# Patient Record
Sex: MALE | Race: WHITE | Employment: FULL TIME | URBAN - NONMETROPOLITAN AREA
[De-identification: names, ages, dates, MRNs, and addresses within clinical notes are randomized per-mention and may not be internally consistent; named-entity substitution may affect disease eponyms.]

---

## 2017-01-04 ENCOUNTER — TELEPHONE (OUTPATIENT)
Dept: PAIN MANAGEMENT | Age: 42
End: 2017-01-04

## 2017-01-04 ENCOUNTER — TELEPHONE (OUTPATIENT)
Dept: INTERNAL MEDICINE | Age: 42
End: 2017-01-04

## 2017-01-09 RX ORDER — LISINOPRIL AND HYDROCHLOROTHIAZIDE 12.5; 1 MG/1; MG/1
TABLET ORAL
Qty: 90 TABLET | Refills: 3 | Status: SHIPPED | OUTPATIENT
Start: 2017-01-09 | End: 2018-01-10 | Stop reason: SDUPTHER

## 2017-01-11 ENCOUNTER — TELEPHONE (OUTPATIENT)
Dept: INTERNAL MEDICINE | Age: 42
End: 2017-01-11

## 2017-01-11 ENCOUNTER — TELEPHONE (OUTPATIENT)
Dept: PAIN MANAGEMENT | Age: 42
End: 2017-01-11

## 2017-01-11 DIAGNOSIS — M96.1 POST LAMINECTOMY SYNDROME: Primary | ICD-10-CM

## 2017-02-03 ENCOUNTER — OFFICE VISIT (OUTPATIENT)
Dept: INTERNAL MEDICINE | Age: 42
End: 2017-02-03

## 2017-02-03 VITALS
HEIGHT: 72 IN | DIASTOLIC BLOOD PRESSURE: 70 MMHG | BODY MASS INDEX: 40.88 KG/M2 | WEIGHT: 301.8 LBS | SYSTOLIC BLOOD PRESSURE: 122 MMHG | HEART RATE: 80 BPM

## 2017-02-03 DIAGNOSIS — R20.0 HAND NUMBNESS: Primary | ICD-10-CM

## 2017-02-03 DIAGNOSIS — G56.21 ULNAR NEUROPATHY AT ELBOW OF RIGHT UPPER EXTREMITY: Primary | ICD-10-CM

## 2017-02-03 DIAGNOSIS — E03.9 HYPOTHYROIDISM, UNSPECIFIED TYPE: ICD-10-CM

## 2017-02-03 DIAGNOSIS — K21.9 GASTROESOPHAGEAL REFLUX DISEASE WITHOUT ESOPHAGITIS: ICD-10-CM

## 2017-02-03 DIAGNOSIS — E78.2 MIXED HYPERLIPIDEMIA: ICD-10-CM

## 2017-02-03 DIAGNOSIS — R93.0 ABNORMAL MRI OF THE HEAD: ICD-10-CM

## 2017-02-03 DIAGNOSIS — E66.01 OBESITY, MORBID, BMI 40.0-49.9 (HCC): ICD-10-CM

## 2017-02-03 DIAGNOSIS — Z72.0 TOBACCO ABUSE: ICD-10-CM

## 2017-02-03 DIAGNOSIS — I10 ESSENTIAL HYPERTENSION: ICD-10-CM

## 2017-02-03 DIAGNOSIS — R73.01 IFG (IMPAIRED FASTING GLUCOSE): ICD-10-CM

## 2017-02-03 DIAGNOSIS — M96.1 POST LAMINECTOMY SYNDROME: ICD-10-CM

## 2017-02-03 DIAGNOSIS — E29.1 HYPOGONADISM IN MALE: ICD-10-CM

## 2017-02-03 PROCEDURE — 99214 OFFICE O/P EST MOD 30 MIN: CPT | Performed by: INTERNAL MEDICINE

## 2017-02-03 RX ORDER — HYDROCODONE BITARTRATE AND ACETAMINOPHEN 10; 325 MG/1; MG/1
TABLET ORAL
Refills: 0 | COMMUNITY
Start: 2017-02-02 | End: 2018-02-27

## 2017-02-03 ASSESSMENT — ENCOUNTER SYMPTOMS
SORE THROAT: 0
BLOOD IN STOOL: 0
SHORTNESS OF BREATH: 0
EYE DISCHARGE: 0
DOUBLE VISION: 0
NAUSEA: 0
COUGH: 0
VOMITING: 0
ABDOMINAL PAIN: 0
CONSTIPATION: 0
BLURRED VISION: 0
EYE PAIN: 0
WHEEZING: 0
DIARRHEA: 0

## 2017-02-22 ENCOUNTER — TELEPHONE (OUTPATIENT)
Dept: INTERNAL MEDICINE | Age: 42
End: 2017-02-22

## 2017-02-22 ENCOUNTER — OFFICE VISIT (OUTPATIENT)
Dept: NEUROLOGY | Age: 42
End: 2017-02-22

## 2017-02-22 DIAGNOSIS — R20.2 PARESTHESIA: ICD-10-CM

## 2017-02-22 DIAGNOSIS — G56.01 CARPAL TUNNEL SYNDROME OF RIGHT WRIST: Primary | ICD-10-CM

## 2017-02-22 DIAGNOSIS — R53.1 WEAKNESS: ICD-10-CM

## 2017-02-22 DIAGNOSIS — G56.21 ULNAR NEUROPATHY AT ELBOW OF RIGHT UPPER EXTREMITY: ICD-10-CM

## 2017-02-22 DIAGNOSIS — M62.838 MUSCLE SPASM: ICD-10-CM

## 2017-02-22 DIAGNOSIS — G56.11 MEDIAN NERVE LESION, RIGHT: ICD-10-CM

## 2017-02-22 DIAGNOSIS — M54.12 CERVICAL RADICULOPATHY: ICD-10-CM

## 2017-02-22 DIAGNOSIS — M79.601 PAIN IN RIGHT ARM: ICD-10-CM

## 2017-02-22 PROCEDURE — 95910 NRV CNDJ TEST 7-8 STUDIES: CPT | Performed by: PSYCHIATRY & NEUROLOGY

## 2017-02-22 PROCEDURE — 95886 MUSC TEST DONE W/N TEST COMP: CPT | Performed by: PSYCHIATRY & NEUROLOGY

## 2017-02-24 ENCOUNTER — TELEPHONE (OUTPATIENT)
Dept: INTERNAL MEDICINE | Age: 42
End: 2017-02-24

## 2017-02-24 DIAGNOSIS — R20.0 NUMBNESS IN BOTH HANDS: Primary | ICD-10-CM

## 2017-05-23 ENCOUNTER — TELEPHONE (OUTPATIENT)
Dept: UROLOGY | Age: 42
End: 2017-05-23

## 2017-05-30 DIAGNOSIS — E29.1 HYPOGONADISM IN MALE: Primary | ICD-10-CM

## 2017-05-30 DIAGNOSIS — E29.1 HYPOGONADISM IN MALE: ICD-10-CM

## 2017-05-30 LAB — PROSTATE SPECIFIC ANTIGEN: 0.72 UG/L

## 2017-05-30 PROCEDURE — 84153 ASSAY OF PSA TOTAL: CPT | Performed by: UROLOGY

## 2017-06-06 ENCOUNTER — OFFICE VISIT (OUTPATIENT)
Dept: UROLOGY | Age: 42
End: 2017-06-06
Payer: COMMERCIAL

## 2017-06-06 VITALS
HEIGHT: 72 IN | DIASTOLIC BLOOD PRESSURE: 88 MMHG | SYSTOLIC BLOOD PRESSURE: 136 MMHG | WEIGHT: 280 LBS | HEART RATE: 88 BPM | BODY MASS INDEX: 37.93 KG/M2

## 2017-06-06 DIAGNOSIS — E29.1 HYPOGONADISM IN MALE: Primary | ICD-10-CM

## 2017-06-06 PROCEDURE — 99213 OFFICE O/P EST LOW 20 MIN: CPT | Performed by: UROLOGY

## 2017-06-06 RX ORDER — TADALAFIL 20 MG/1
20 TABLET ORAL PRN
Qty: 36 TABLET | Refills: 3 | Status: SHIPPED | OUTPATIENT
Start: 2017-06-06 | End: 2018-08-20 | Stop reason: SDUPTHER

## 2017-06-06 RX ORDER — CLONAZEPAM 1 MG/1
1 TABLET ORAL DAILY
COMMUNITY
End: 2019-03-13

## 2017-06-07 RX ORDER — TESTOSTERONE CYPIONATE 200 MG/ML
200 INJECTION, SOLUTION INTRAMUSCULAR
Qty: 5 VIAL | Refills: 5 | Status: SHIPPED | OUTPATIENT
Start: 2017-06-07 | End: 2018-03-13

## 2017-08-15 ENCOUNTER — OFFICE VISIT (OUTPATIENT)
Dept: INTERNAL MEDICINE | Age: 42
End: 2017-08-15
Payer: COMMERCIAL

## 2017-08-15 VITALS
HEIGHT: 72 IN | TEMPERATURE: 97.6 F | HEART RATE: 80 BPM | RESPIRATION RATE: 18 BRPM | DIASTOLIC BLOOD PRESSURE: 80 MMHG | BODY MASS INDEX: 40.12 KG/M2 | SYSTOLIC BLOOD PRESSURE: 132 MMHG | WEIGHT: 296.2 LBS

## 2017-08-15 DIAGNOSIS — R10.11 RUQ PAIN: Primary | ICD-10-CM

## 2017-08-15 PROCEDURE — 99213 OFFICE O/P EST LOW 20 MIN: CPT | Performed by: NURSE PRACTITIONER

## 2017-08-15 ASSESSMENT — ENCOUNTER SYMPTOMS
VOMITING: 0
COUGH: 0
WHEEZING: 0
ANAL BLEEDING: 0
CHOKING: 0
BACK PAIN: 1
APNEA: 0
RECTAL PAIN: 0
ABDOMINAL DISTENTION: 0
ABDOMINAL PAIN: 1
DIARRHEA: 0
NAUSEA: 0
BLOOD IN STOOL: 0
CONSTIPATION: 0
COLOR CHANGE: 0
SHORTNESS OF BREATH: 0

## 2017-08-22 ENCOUNTER — HOSPITAL ENCOUNTER (OUTPATIENT)
Dept: ULTRASOUND IMAGING | Age: 42
Discharge: HOME | End: 2017-08-22
Payer: COMMERCIAL

## 2017-08-22 ENCOUNTER — HOSPITAL ENCOUNTER (OUTPATIENT)
Dept: LAB | Age: 42
Discharge: HOME | End: 2017-08-22
Payer: COMMERCIAL

## 2017-08-22 DIAGNOSIS — E29.1 HYPOGONADISM IN MALE: ICD-10-CM

## 2017-08-22 DIAGNOSIS — R10.11 RUQ PAIN: ICD-10-CM

## 2017-08-22 LAB
ALBUMIN SERPL-MCNC: 4.4 G/DL (ref 3.5–5.2)
ALBUMIN/GLOBULIN RATIO: 1.5 (ref 1–2.5)
ALP BLD-CCNC: 80 U/L (ref 40–129)
ALT SERPL-CCNC: 30 U/L (ref 5–41)
ANION GAP SERPL CALCULATED.3IONS-SCNC: 13 MMOL/L (ref 9–17)
AST SERPL-CCNC: 22 U/L
BILIRUB SERPL-MCNC: 0.25 MG/DL (ref 0.3–1.2)
BUN BLDV-MCNC: 11 MG/DL (ref 6–20)
BUN/CREAT BLD: 19 (ref 9–20)
CALCIUM SERPL-MCNC: 9.1 MG/DL (ref 8.6–10.4)
CHLORIDE BLD-SCNC: 103 MMOL/L (ref 98–107)
CO2: 26 MMOL/L (ref 20–31)
CREAT SERPL-MCNC: 0.58 MG/DL (ref 0.7–1.2)
GFR AFRICAN AMERICAN: >60 ML/MIN
GFR NON-AFRICAN AMERICAN: >60 ML/MIN
GFR SERPL CREATININE-BSD FRML MDRD: ABNORMAL ML/MIN/{1.73_M2}
GFR SERPL CREATININE-BSD FRML MDRD: ABNORMAL ML/MIN/{1.73_M2}
GLUCOSE BLD-MCNC: 118 MG/DL (ref 70–99)
POTASSIUM SERPL-SCNC: 4.5 MMOL/L (ref 3.7–5.3)
SEX HORMONE BINDING GLOBULIN: 18 NMOL/L (ref 11–80)
SODIUM BLD-SCNC: 142 MMOL/L (ref 135–144)
TESTOSTERONE FREE-NONMALE: 57.8 PG/ML (ref 47–244)
TESTOSTERONE TOTAL: 222 NG/DL (ref 220–1000)
TOTAL PROTEIN: 7.4 G/DL (ref 6.4–8.3)

## 2017-08-22 PROCEDURE — 84270 ASSAY OF SEX HORMONE GLOBUL: CPT

## 2017-08-22 PROCEDURE — 84403 ASSAY OF TOTAL TESTOSTERONE: CPT

## 2017-08-22 PROCEDURE — 36415 COLL VENOUS BLD VENIPUNCTURE: CPT

## 2017-08-22 PROCEDURE — 76705 ECHO EXAM OF ABDOMEN: CPT

## 2017-08-22 PROCEDURE — 80053 COMPREHEN METABOLIC PANEL: CPT

## 2017-09-05 ENCOUNTER — TELEPHONE (OUTPATIENT)
Dept: UROLOGY | Age: 42
End: 2017-09-05

## 2017-09-06 ENCOUNTER — TELEPHONE (OUTPATIENT)
Dept: UROLOGY | Age: 42
End: 2017-09-06

## 2017-09-19 ENCOUNTER — OFFICE VISIT (OUTPATIENT)
Dept: UROLOGY | Age: 42
End: 2017-09-19
Payer: COMMERCIAL

## 2017-09-19 VITALS
WEIGHT: 285 LBS | DIASTOLIC BLOOD PRESSURE: 80 MMHG | SYSTOLIC BLOOD PRESSURE: 136 MMHG | HEIGHT: 72 IN | BODY MASS INDEX: 38.6 KG/M2 | HEART RATE: 88 BPM

## 2017-09-19 DIAGNOSIS — E29.1 HYPOGONADISM IN MALE: Primary | ICD-10-CM

## 2017-09-19 PROCEDURE — 99213 OFFICE O/P EST LOW 20 MIN: CPT | Performed by: UROLOGY

## 2017-09-29 RX ORDER — LORCASERIN HYDROCHLORIDE HEMIHYDRATE 10 MG/1
TABLET ORAL
Qty: 60 TABLET | Refills: 3 | OUTPATIENT
Start: 2017-09-29

## 2018-01-10 RX ORDER — LISINOPRIL AND HYDROCHLOROTHIAZIDE 12.5; 1 MG/1; MG/1
TABLET ORAL
Qty: 90 TABLET | Refills: 3 | Status: SHIPPED | OUTPATIENT
Start: 2018-01-10 | End: 2019-11-26

## 2018-02-27 ENCOUNTER — OFFICE VISIT (OUTPATIENT)
Dept: INTERNAL MEDICINE | Age: 43
End: 2018-02-27
Payer: COMMERCIAL

## 2018-02-27 VITALS
HEART RATE: 96 BPM | SYSTOLIC BLOOD PRESSURE: 136 MMHG | HEIGHT: 72 IN | DIASTOLIC BLOOD PRESSURE: 84 MMHG | WEIGHT: 285 LBS | BODY MASS INDEX: 38.6 KG/M2

## 2018-02-27 DIAGNOSIS — N52.9 ERECTILE DYSFUNCTION, UNSPECIFIED ERECTILE DYSFUNCTION TYPE: ICD-10-CM

## 2018-02-27 DIAGNOSIS — E78.2 MIXED HYPERLIPIDEMIA: ICD-10-CM

## 2018-02-27 DIAGNOSIS — R73.01 IFG (IMPAIRED FASTING GLUCOSE): ICD-10-CM

## 2018-02-27 DIAGNOSIS — Z11.4 SCREENING FOR HIV WITHOUT PRESENCE OF RISK FACTORS: ICD-10-CM

## 2018-02-27 DIAGNOSIS — M25.512 ACUTE PAIN OF LEFT SHOULDER: ICD-10-CM

## 2018-02-27 DIAGNOSIS — R93.0 ABNORMAL MRI OF THE HEAD: ICD-10-CM

## 2018-02-27 DIAGNOSIS — E03.9 HYPOTHYROIDISM, UNSPECIFIED TYPE: ICD-10-CM

## 2018-02-27 DIAGNOSIS — M54.50 ACUTE MIDLINE LOW BACK PAIN WITHOUT SCIATICA: ICD-10-CM

## 2018-02-27 DIAGNOSIS — E29.1 HYPOGONADISM IN MALE: ICD-10-CM

## 2018-02-27 DIAGNOSIS — Z72.0 TOBACCO ABUSE: ICD-10-CM

## 2018-02-27 DIAGNOSIS — E66.01 OBESITY, MORBID, BMI 40.0-49.9 (HCC): ICD-10-CM

## 2018-02-27 DIAGNOSIS — K76.0 FATTY LIVER: ICD-10-CM

## 2018-02-27 DIAGNOSIS — M96.1 POST LAMINECTOMY SYNDROME: ICD-10-CM

## 2018-02-27 DIAGNOSIS — I10 ESSENTIAL HYPERTENSION: ICD-10-CM
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
## 2018-02-27 DIAGNOSIS — F41.9 ANXIETY: ICD-10-CM

## 2018-02-27 PROCEDURE — 99214 OFFICE O/P EST MOD 30 MIN: CPT | Performed by: INTERNAL MEDICINE

## 2018-02-27 RX ORDER — OXYCODONE AND ACETAMINOPHEN 7.5; 325 MG/1; MG/1
1 TABLET ORAL 4 TIMES DAILY PRN
Refills: 0 | COMMUNITY
Start: 2018-02-02 | End: 2020-11-04

## 2018-02-27 ASSESSMENT — PATIENT HEALTH QUESTIONNAIRE - PHQ9
SUM OF ALL RESPONSES TO PHQ9 QUESTIONS 1 & 2: 0
2. FEELING DOWN, DEPRESSED OR HOPELESS: 0
1. LITTLE INTEREST OR PLEASURE IN DOING THINGS: 0
SUM OF ALL RESPONSES TO PHQ QUESTIONS 1-9: 0

## 2018-02-28 ENCOUNTER — HOSPITAL ENCOUNTER (OUTPATIENT)
Dept: GENERAL RADIOLOGY | Age: 43
Discharge: HOME | End: 2018-03-02
Payer: COMMERCIAL

## 2018-02-28 ENCOUNTER — HOSPITAL ENCOUNTER (OUTPATIENT)
Dept: LAB | Age: 43
Setting detail: SPECIMEN
Discharge: HOME | End: 2018-02-28
Payer: COMMERCIAL

## 2018-02-28 DIAGNOSIS — E29.1 HYPOGONADISM IN MALE: ICD-10-CM

## 2018-02-28 DIAGNOSIS — R73.01 IFG (IMPAIRED FASTING GLUCOSE): ICD-10-CM

## 2018-02-28 DIAGNOSIS — I10 ESSENTIAL HYPERTENSION: ICD-10-CM

## 2018-02-28 DIAGNOSIS — M25.512 ACUTE PAIN OF LEFT SHOULDER: ICD-10-CM

## 2018-02-28 DIAGNOSIS — Z11.4 SCREENING FOR HIV WITHOUT PRESENCE OF RISK FACTORS: ICD-10-CM

## 2018-02-28 DIAGNOSIS — M54.50 ACUTE MIDLINE LOW BACK PAIN WITHOUT SCIATICA: ICD-10-CM

## 2018-02-28 DIAGNOSIS — E03.9 HYPOTHYROIDISM, UNSPECIFIED TYPE: ICD-10-CM

## 2018-02-28 DIAGNOSIS — E78.2 MIXED HYPERLIPIDEMIA: ICD-10-CM

## 2018-02-28 LAB
ABSOLUTE EOS #: 0.1 K/UL (ref 0–0.4)
ABSOLUTE IMMATURE GRANULOCYTE: ABNORMAL K/UL (ref 0–0.3)
ABSOLUTE LYMPH #: 2.8 K/UL (ref 1–4.8)
ABSOLUTE MONO #: 0.6 K/UL (ref 0.1–1.2)
ALBUMIN SERPL-MCNC: 4.4 G/DL (ref 3.5–5.2)
ALBUMIN/GLOBULIN RATIO: 1.4 (ref 1–2.5)
ALP BLD-CCNC: 80 U/L (ref 40–129)
ALT SERPL-CCNC: 13 U/L (ref 5–41)
ANION GAP SERPL CALCULATED.3IONS-SCNC: 13 MMOL/L (ref 9–17)
AST SERPL-CCNC: 12 U/L
BASOPHILS # BLD: 0 % (ref 0–2)
BASOPHILS ABSOLUTE: 0 K/UL (ref 0–0.2)
BILIRUB SERPL-MCNC: 0.35 MG/DL (ref 0.3–1.2)
BUN BLDV-MCNC: 14 MG/DL (ref 6–20)
BUN/CREAT BLD: 24 (ref 9–20)
CALCIUM SERPL-MCNC: 9.4 MG/DL (ref 8.6–10.4)
CHLORIDE BLD-SCNC: 102 MMOL/L (ref 98–107)
CHOLESTEROL/HDL RATIO: 5
CHOLESTEROL: 211 MG/DL
CO2: 28 MMOL/L (ref 20–31)
CREAT SERPL-MCNC: 0.58 MG/DL (ref 0.7–1.2)
DIFFERENTIAL TYPE: ABNORMAL
EOSINOPHILS RELATIVE PERCENT: 1 % (ref 1–8)
ESTIMATED AVERAGE GLUCOSE: 128 MG/DL
GFR AFRICAN AMERICAN: >60 ML/MIN
GFR NON-AFRICAN AMERICAN: >60 ML/MIN
GFR SERPL CREATININE-BSD FRML MDRD: ABNORMAL ML/MIN/{1.73_M2}
GFR SERPL CREATININE-BSD FRML MDRD: ABNORMAL ML/MIN/{1.73_M2}
GLUCOSE BLD-MCNC: 108 MG/DL (ref 70–99)
HBA1C MFR BLD: 6.1 % (ref 4.8–5.9)
HCT VFR BLD CALC: 41.3 % (ref 41–53)
HDLC SERPL-MCNC: 42 MG/DL
HEMOGLOBIN: 13.9 G/DL (ref 13.5–17.5)
HIV AG/AB: NONREACTIVE
IMMATURE GRANULOCYTES: ABNORMAL %
LDL CHOLESTEROL: 145 MG/DL (ref 0–130)
LYMPHOCYTES # BLD: 37 % (ref 15–43)
MCH RBC QN AUTO: 31.6 PG (ref 26–34)
MCHC RBC AUTO-ENTMCNC: 33.5 G/DL (ref 31–37)
MCV RBC AUTO: 94.4 FL (ref 80–100)
MONOCYTES # BLD: 8 % (ref 6–14)
NRBC AUTOMATED: ABNORMAL PER 100 WBC
PDW BLD-RTO: 13.5 % (ref 11–14.5)
PLATELET # BLD: 240 K/UL (ref 140–450)
PLATELET ESTIMATE: ABNORMAL
PMV BLD AUTO: 9.5 FL (ref 6–12)
POTASSIUM SERPL-SCNC: 4.4 MMOL/L (ref 3.7–5.3)
PROSTATE SPECIFIC ANTIGEN: 0.52 UG/L
RBC # BLD: 4.38 M/UL (ref 4.5–5.9)
RBC # BLD: ABNORMAL 10*6/UL
SEG NEUTROPHILS: 54 % (ref 44–74)
SEGMENTED NEUTROPHILS ABSOLUTE COUNT: 4 K/UL (ref 1.8–7.7)
SODIUM BLD-SCNC: 143 MMOL/L (ref 135–144)
TESTOSTERONE TOTAL: 262 NG/DL (ref 220–1000)
TOTAL PROTEIN: 7.6 G/DL (ref 6.4–8.3)
TRIGL SERPL-MCNC: 119 MG/DL
TSH SERPL DL<=0.05 MIU/L-ACNC: 1.03 MIU/L (ref 0.3–5)
VLDLC SERPL CALC-MCNC: ABNORMAL MG/DL (ref 1–30)
WBC # BLD: 7.5 K/UL (ref 3.5–11)
WBC # BLD: ABNORMAL 10*3/UL

## 2018-02-28 PROCEDURE — 73030 X-RAY EXAM OF SHOULDER: CPT

## 2018-02-28 PROCEDURE — 87389 HIV-1 AG W/HIV-1&-2 AB AG IA: CPT

## 2018-02-28 PROCEDURE — 80061 LIPID PANEL: CPT

## 2018-02-28 PROCEDURE — 84443 ASSAY THYROID STIM HORMONE: CPT

## 2018-02-28 PROCEDURE — 36415 COLL VENOUS BLD VENIPUNCTURE: CPT

## 2018-02-28 PROCEDURE — 72100 X-RAY EXAM L-S SPINE 2/3 VWS: CPT

## 2018-02-28 PROCEDURE — 84153 ASSAY OF PSA TOTAL: CPT

## 2018-02-28 PROCEDURE — 83036 HEMOGLOBIN GLYCOSYLATED A1C: CPT

## 2018-02-28 PROCEDURE — 80053 COMPREHEN METABOLIC PANEL: CPT

## 2018-02-28 PROCEDURE — 84403 ASSAY OF TOTAL TESTOSTERONE: CPT

## 2018-02-28 PROCEDURE — 85025 COMPLETE CBC W/AUTO DIFF WBC: CPT
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
## 2018-03-01 DIAGNOSIS — R93.0 ABNORMAL MRI OF HEAD: Primary | ICD-10-CM

## 2018-03-01 DIAGNOSIS — M25.512 LEFT SHOULDER PAIN, UNSPECIFIED CHRONICITY: ICD-10-CM

## 2018-03-01 DIAGNOSIS — M54.50 LOW BACK PAIN WITHOUT SCIATICA, UNSPECIFIED BACK PAIN LATERALITY, UNSPECIFIED CHRONICITY: Primary | ICD-10-CM
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
## 2018-03-13 ENCOUNTER — OFFICE VISIT (OUTPATIENT)
Dept: INTERNAL MEDICINE | Age: 43
End: 2018-03-13
Payer: COMMERCIAL

## 2018-03-13 VITALS
WEIGHT: 288 LBS | HEIGHT: 72 IN | BODY MASS INDEX: 39.01 KG/M2 | SYSTOLIC BLOOD PRESSURE: 136 MMHG | DIASTOLIC BLOOD PRESSURE: 86 MMHG | HEART RATE: 96 BPM

## 2018-03-13 DIAGNOSIS — E29.1 HYPOGONADISM IN MALE: ICD-10-CM

## 2018-03-13 DIAGNOSIS — E78.2 MIXED HYPERLIPIDEMIA: ICD-10-CM

## 2018-03-13 DIAGNOSIS — R41.3 MEMORY CHANGE: ICD-10-CM

## 2018-03-13 DIAGNOSIS — I10 ESSENTIAL HYPERTENSION: ICD-10-CM

## 2018-03-13 DIAGNOSIS — E66.01 OBESITY, MORBID, BMI 40.0-49.9 (HCC): ICD-10-CM

## 2018-03-13 DIAGNOSIS — E03.9 HYPOTHYROIDISM, UNSPECIFIED TYPE: ICD-10-CM

## 2018-03-13 DIAGNOSIS — R73.01 IFG (IMPAIRED FASTING GLUCOSE): ICD-10-CM
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
## 2018-03-13 DIAGNOSIS — M54.50 ACUTE MIDLINE LOW BACK PAIN WITHOUT SCIATICA: ICD-10-CM

## 2018-03-13 DIAGNOSIS — K76.0 FATTY LIVER: ICD-10-CM

## 2018-03-13 DIAGNOSIS — F41.9 ANXIETY: ICD-10-CM

## 2018-03-13 DIAGNOSIS — Z72.0 TOBACCO ABUSE: ICD-10-CM

## 2018-03-13 DIAGNOSIS — M96.1 POST LAMINECTOMY SYNDROME: ICD-10-CM

## 2018-03-13 DIAGNOSIS — M25.512 ACUTE PAIN OF LEFT SHOULDER: ICD-10-CM

## 2018-03-13 DIAGNOSIS — R93.0 ABNORMAL MRI OF THE HEAD: ICD-10-CM
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
## 2018-03-13 PROCEDURE — 99214 OFFICE O/P EST MOD 30 MIN: CPT | Performed by: INTERNAL MEDICINE

## 2018-03-13 NOTE — PROGRESS NOTES
The 10-year ASCVD risk score (Mariam Putnam et al., 2013) is: 6.8%    Values used to calculate the score:      Age: 38 years      Sex: Male      Is Non- : No      Diabetic: No      Tobacco smoker: Yes      Systolic Blood Pressure: 276 mmHg      Is BP treated: Yes      HDL Cholesterol: 42 mg/dL      Total Cholesterol: 211 mg/dL
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
## 2018-03-27 ENCOUNTER — HOSPITAL ENCOUNTER (OUTPATIENT)
Dept: LAB | Age: 43
Setting detail: SPECIMEN
Discharge: HOME | End: 2018-03-27
Payer: COMMERCIAL

## 2018-03-27 DIAGNOSIS — R41.3 MEMORY CHANGE: ICD-10-CM

## 2018-03-27 LAB — VITAMIN B-12: 324 PG/ML (ref 232–1245)

## 2018-03-27 PROCEDURE — 82607 VITAMIN B-12: CPT

## 2018-03-27 PROCEDURE — 36415 COLL VENOUS BLD VENIPUNCTURE: CPT

## 2018-04-03 ENCOUNTER — TELEPHONE (OUTPATIENT)
Dept: INTERNAL MEDICINE | Age: 43
End: 2018-04-03

## 2018-04-03 DIAGNOSIS — F41.9 ANXIETY: Primary | ICD-10-CM

## 2018-05-31 ENCOUNTER — OFFICE VISIT (OUTPATIENT)
Dept: INTERNAL MEDICINE | Age: 43
End: 2018-05-31
Payer: COMMERCIAL

## 2018-05-31 VITALS
BODY MASS INDEX: 38.74 KG/M2 | HEIGHT: 72 IN | SYSTOLIC BLOOD PRESSURE: 114 MMHG | DIASTOLIC BLOOD PRESSURE: 86 MMHG | HEART RATE: 92 BPM | WEIGHT: 286 LBS

## 2018-05-31 DIAGNOSIS — F41.9 ANXIETY: Primary | ICD-10-CM

## 2018-05-31 DIAGNOSIS — K76.0 FATTY LIVER: ICD-10-CM

## 2018-05-31 DIAGNOSIS — E66.01 OBESITY, MORBID, BMI 40.0-49.9 (HCC): ICD-10-CM

## 2018-05-31 DIAGNOSIS — I10 ESSENTIAL HYPERTENSION: ICD-10-CM

## 2018-05-31 DIAGNOSIS — R93.0 ABNORMAL MRI OF THE HEAD: ICD-10-CM

## 2018-05-31 DIAGNOSIS — Z72.0 TOBACCO ABUSE: ICD-10-CM

## 2018-05-31 DIAGNOSIS — E78.2 MIXED HYPERLIPIDEMIA: ICD-10-CM

## 2018-05-31 DIAGNOSIS — M96.1 POST LAMINECTOMY SYNDROME: ICD-10-CM

## 2018-05-31 DIAGNOSIS — R73.01 IFG (IMPAIRED FASTING GLUCOSE): ICD-10-CM

## 2018-05-31 DIAGNOSIS — E03.9 HYPOTHYROIDISM, UNSPECIFIED TYPE: ICD-10-CM

## 2018-05-31 PROCEDURE — 99213 OFFICE O/P EST LOW 20 MIN: CPT | Performed by: INTERNAL MEDICINE

## 2018-06-07 ENCOUNTER — HOSPITAL ENCOUNTER (OUTPATIENT)
Dept: LAB | Age: 43
Setting detail: SPECIMEN
Discharge: HOME | End: 2018-06-07
Payer: COMMERCIAL

## 2018-06-07 PROCEDURE — 36415 COLL VENOUS BLD VENIPUNCTURE: CPT

## 2018-06-07 PROCEDURE — 82652 VIT D 1 25-DIHYDROXY: CPT

## 2018-06-10 LAB — VITAMIN D 1,25-DIHYDROXY: 40.1 PG/ML (ref 19.9–79.3)

## 2018-08-20 DIAGNOSIS — E29.1 HYPOGONADISM IN MALE: ICD-10-CM

## 2018-08-20 RX ORDER — TADALAFIL 20 MG/1
20 TABLET ORAL PRN
Qty: 36 TABLET | Refills: 3 | Status: SHIPPED | OUTPATIENT
Start: 2018-08-20 | End: 2019-01-07 | Stop reason: SDUPTHER

## 2018-09-24 ENCOUNTER — OFFICE VISIT (OUTPATIENT)
Dept: INTERNAL MEDICINE | Age: 43
End: 2018-09-24
Payer: COMMERCIAL

## 2018-09-24 VITALS
DIASTOLIC BLOOD PRESSURE: 82 MMHG | HEIGHT: 72 IN | BODY MASS INDEX: 39.28 KG/M2 | WEIGHT: 290 LBS | SYSTOLIC BLOOD PRESSURE: 112 MMHG | HEART RATE: 84 BPM

## 2018-09-24 DIAGNOSIS — E66.01 OBESITY, MORBID, BMI 40.0-49.9 (HCC): ICD-10-CM

## 2018-09-24 DIAGNOSIS — Z87.891 PERSONAL HISTORY OF TOBACCO USE, PRESENTING HAZARDS TO HEALTH: ICD-10-CM

## 2018-09-24 DIAGNOSIS — R93.0 ABNORMAL MRI OF THE HEAD: ICD-10-CM

## 2018-09-24 DIAGNOSIS — R73.01 IFG (IMPAIRED FASTING GLUCOSE): Primary | ICD-10-CM

## 2018-09-24 DIAGNOSIS — M96.1 POST LAMINECTOMY SYNDROME: ICD-10-CM

## 2018-09-24 DIAGNOSIS — I10 ESSENTIAL HYPERTENSION: ICD-10-CM

## 2018-09-24 DIAGNOSIS — K76.0 FATTY LIVER: ICD-10-CM

## 2018-09-24 DIAGNOSIS — F41.9 ANXIETY: ICD-10-CM

## 2018-09-24 DIAGNOSIS — E03.9 HYPOTHYROIDISM, UNSPECIFIED TYPE: ICD-10-CM

## 2018-09-24 DIAGNOSIS — E78.2 MIXED HYPERLIPIDEMIA: ICD-10-CM

## 2018-09-24 PROCEDURE — 99214 OFFICE O/P EST MOD 30 MIN: CPT | Performed by: INTERNAL MEDICINE

## 2018-09-24 PROCEDURE — 99406 BEHAV CHNG SMOKING 3-10 MIN: CPT | Performed by: INTERNAL MEDICINE

## 2018-10-17 NOTE — PROGRESS NOTES
Marsha Miguel received counseling on the following healthy behaviors: tobacco cessation  Reviewed prior labs and health maintenance  Continue current medications except where noted below, diet and exercise. Discussed use, benefit, and side effects of prescribed medications. Barriers to medication compliance addressed. Patient given educational materials - see patient instructions  Was a self-tracking handout given in paper form or via [employer]? Yes    Requested Prescriptions      No prescriptions requested or ordered in this encounter       All patient questions answered. Patient voiced understanding. Quality Measures    Body mass index is 39.33 kg/m². Elevated. Weight control planned discussed: Healthy diet and regular exercise    BP: (!) 120/94 Blood pressure is high. Treatment plan consists of: see progress note below. Lab Results   Component Value Date    LDLCHOLESTEROL 145 (H) 02/28/2018    (goal LDL reduction with dx if diabetes is 50% LDL reduction)      PHQ Scores 2/27/2018 11/14/2016   PHQ2 Score 0 0   PHQ9 Score 0 0     Interpretation of Total Score Depression Severity: 1-4 = Minimal depression, 5-9 = Mild depression, 10-14 = Moderate depression, 15-19 = Moderately severe depression, 20-27 = Severe depression      Tobacco Cessation Counseling: Patient advised about behavior change, including information about personal health harms, usage of appropriate cessation measures and benefits of cessation.   Time spent (minutes): 3
Overdue lab reminder letter mailed to patient.
He has a normal mood and affect. Her behavior is normal.  Judgment normal.       Lab Results   Component Value Date    K 4.4 02/28/2018    CREATININE 0.58 02/28/2018    AST 12 02/28/2018    ALT 13 02/28/2018    TSH 1.03 02/28/2018    HCT 41.3 02/28/2018    LABA1C 6.1 02/28/2018    PSA 0.52 02/28/2018    GLUCOSE 108 02/28/2018    CALCIUM 9.4 02/28/2018    IVSZXTUR44 324 03/27/2018      Lab Results   Component Value Date    CHOL 211 02/28/2018    TRIG 119 02/28/2018    HDL 42 02/28/2018    LDLCHOLESTEROL 145 02/28/2018       Labs reviewed and discussed with the patient? Yes    Assessment/Plan:    1. IFG (impaired fasting glucose)  Fasting glucose pending today. Work on diet. - Comprehensive Metabolic Panel; Future    2. Fatty liver  Most recent LFTs looked normal.  Repeat LFTs pending. Work on diet, exercise and weight loss. 3. Abnormal MRI of the head  Saw Dr. Kwame Cole, neurologist in [facility], who recommended a repeat MRI at a period of 3-5 years. He does not have symptoms referable to this. 4. Anxiety  We are going to request the records from Dr. Niya Mota office for review. Appears to be stable. Addenda 10/7/18  RX history of prozac 20 qd, and divalproex 250 mg BID per Dr Gopal Lau August 2018  5. Post laminectomy syndrome  Following with Dr. Crow Avery up at [address] Appears stable. 6. Hypothyroidism, unspecified type  Not on medication at this point. Most recent TSH looked good. Continue to monitor. 7. Essential hypertension  Well controlled on current regimen. No change. 8. Mixed hyperlipidemia  10-year risk is about 6.8%. Working on diet. Continue to monitor. 9. Obesity, morbid, BMI 40.0-49.9 ([facility])  We discussed diet, exercise and weight loss at length. 10. Personal history of tobacco use, presenting hazards to health  Counseled at length on cessation.  - [place] TOBACCO USE CESSATION INTERMEDIATE 3-10 MINUTES [59427]       11. Declines flu shot.     He will call if any

## 2018-11-28 ENCOUNTER — TELEPHONE (OUTPATIENT)
Dept: INTERNAL MEDICINE | Age: 43
End: 2018-11-28

## 2018-12-21 ENCOUNTER — HOSPITAL ENCOUNTER (OUTPATIENT)
Dept: LAB | Age: 43
Discharge: HOME | End: 2018-12-21
Payer: COMMERCIAL

## 2018-12-21 DIAGNOSIS — R73.01 IFG (IMPAIRED FASTING GLUCOSE): ICD-10-CM

## 2018-12-21 LAB
ALBUMIN SERPL-MCNC: 4.7 G/DL (ref 3.5–5.2)
ALBUMIN/GLOBULIN RATIO: 1.4 (ref 1–2.5)
ALP BLD-CCNC: 82 U/L (ref 40–129)
ALT SERPL-CCNC: 20 U/L (ref 5–41)
ANION GAP SERPL CALCULATED.3IONS-SCNC: 13 MMOL/L (ref 9–17)
AST SERPL-CCNC: 20 U/L
BILIRUB SERPL-MCNC: 0.34 MG/DL (ref 0.3–1.2)
BUN BLDV-MCNC: 14 MG/DL (ref 6–20)
BUN/CREAT BLD: 17 (ref 9–20)
CALCIUM SERPL-MCNC: 9.2 MG/DL (ref 8.6–10.4)
CHLORIDE BLD-SCNC: 102 MMOL/L (ref 98–107)
CO2: 26 MMOL/L (ref 20–31)
CREAT SERPL-MCNC: 0.83 MG/DL (ref 0.7–1.2)
GFR AFRICAN AMERICAN: >60 ML/MIN
GFR NON-AFRICAN AMERICAN: >60 ML/MIN
GFR SERPL CREATININE-BSD FRML MDRD: NORMAL ML/MIN/{1.73_M2}
GFR SERPL CREATININE-BSD FRML MDRD: NORMAL ML/MIN/{1.73_M2}
GLUCOSE BLD-MCNC: 93 MG/DL (ref 70–99)
POTASSIUM SERPL-SCNC: 3.8 MMOL/L (ref 3.7–5.3)
SODIUM BLD-SCNC: 141 MMOL/L (ref 135–144)
TOTAL PROTEIN: 8 G/DL (ref 6.4–8.3)

## 2018-12-21 PROCEDURE — 80053 COMPREHEN METABOLIC PANEL: CPT

## 2018-12-21 PROCEDURE — 36415 COLL VENOUS BLD VENIPUNCTURE: CPT

## 2019-01-07 DIAGNOSIS — E29.1 HYPOGONADISM IN MALE: ICD-10-CM

## 2019-01-07 RX ORDER — TADALAFIL 20 MG/1
20 TABLET ORAL PRN
Qty: 36 TABLET | Refills: 3 | Status: SHIPPED | OUTPATIENT
Start: 2019-01-07 | End: 2019-03-13 | Stop reason: SDUPTHER

## 2019-03-01 ENCOUNTER — TELEPHONE (OUTPATIENT)
Dept: INTERNAL MEDICINE | Age: 44
End: 2019-03-01

## 2019-03-13 ENCOUNTER — OFFICE VISIT (OUTPATIENT)
Dept: INTERNAL MEDICINE | Age: 44
End: 2019-03-13
Payer: COMMERCIAL

## 2019-03-13 VITALS
BODY MASS INDEX: 40.77 KG/M2 | HEART RATE: 88 BPM | SYSTOLIC BLOOD PRESSURE: 132 MMHG | WEIGHT: 301 LBS | HEIGHT: 72 IN | DIASTOLIC BLOOD PRESSURE: 94 MMHG

## 2019-03-13 DIAGNOSIS — M96.1 POST LAMINECTOMY SYNDROME: Primary | ICD-10-CM

## 2019-03-13 DIAGNOSIS — R73.01 IFG (IMPAIRED FASTING GLUCOSE): ICD-10-CM

## 2019-03-13 DIAGNOSIS — K76.0 FATTY LIVER: ICD-10-CM

## 2019-03-13 DIAGNOSIS — M79.672 PAIN IN BOTH FEET: ICD-10-CM

## 2019-03-13 DIAGNOSIS — R93.0 ABNORMAL MRI OF THE HEAD: ICD-10-CM

## 2019-03-13 DIAGNOSIS — I10 ESSENTIAL HYPERTENSION: ICD-10-CM

## 2019-03-13 DIAGNOSIS — Z72.0 TOBACCO ABUSE: ICD-10-CM

## 2019-03-13 DIAGNOSIS — F41.9 ANXIETY: ICD-10-CM

## 2019-03-13 DIAGNOSIS — E03.9 HYPOTHYROIDISM, UNSPECIFIED TYPE: ICD-10-CM

## 2019-03-13 DIAGNOSIS — E66.01 OBESITY, MORBID, BMI 40.0-49.9 (HCC): ICD-10-CM

## 2019-03-13 DIAGNOSIS — E78.2 MIXED HYPERLIPIDEMIA: ICD-10-CM

## 2019-03-13 DIAGNOSIS — M79.671 PAIN IN BOTH FEET: ICD-10-CM

## 2019-03-13 PROCEDURE — 99214 OFFICE O/P EST MOD 30 MIN: CPT | Performed by: INTERNAL MEDICINE

## 2019-03-13 RX ORDER — TADALAFIL 20 MG/1
20 TABLET ORAL PRN
Qty: 36 TABLET | Refills: 3 | Status: SHIPPED | OUTPATIENT
Start: 2019-03-13 | End: 2020-04-01 | Stop reason: SDUPTHER

## 2019-03-13 ASSESSMENT — PATIENT HEALTH QUESTIONNAIRE - PHQ9
2. FEELING DOWN, DEPRESSED OR HOPELESS: 0
SUM OF ALL RESPONSES TO PHQ QUESTIONS 1-9: 0
SUM OF ALL RESPONSES TO PHQ9 QUESTIONS 1 & 2: 0
SUM OF ALL RESPONSES TO PHQ QUESTIONS 1-9: 0
1. LITTLE INTEREST OR PLEASURE IN DOING THINGS: 0

## 2019-03-14 DIAGNOSIS — L98.9 FACIAL SKIN LESION: Primary | ICD-10-CM

## 2019-03-19 ENCOUNTER — HOSPITAL ENCOUNTER (OUTPATIENT)
Dept: LAB | Age: 44
Discharge: HOME | End: 2019-03-19
Payer: COMMERCIAL

## 2019-03-19 ENCOUNTER — OFFICE VISIT (OUTPATIENT)
Dept: PODIATRY | Age: 44
End: 2019-03-19
Payer: COMMERCIAL

## 2019-03-19 VITALS
BODY MASS INDEX: 40.63 KG/M2 | SYSTOLIC BLOOD PRESSURE: 138 MMHG | HEART RATE: 88 BPM | WEIGHT: 300 LBS | DIASTOLIC BLOOD PRESSURE: 80 MMHG | HEIGHT: 72 IN

## 2019-03-19 DIAGNOSIS — E78.2 MIXED HYPERLIPIDEMIA: ICD-10-CM

## 2019-03-19 DIAGNOSIS — E03.9 HYPOTHYROIDISM, UNSPECIFIED TYPE: ICD-10-CM

## 2019-03-19 DIAGNOSIS — G89.29 CHRONIC PAIN OF RIGHT ANKLE: ICD-10-CM

## 2019-03-19 DIAGNOSIS — M25.571 CHRONIC PAIN OF RIGHT ANKLE: ICD-10-CM

## 2019-03-19 DIAGNOSIS — M72.2 PLANTAR FASCIITIS, BILATERAL: Primary | ICD-10-CM

## 2019-03-19 DIAGNOSIS — M79.672 PAIN IN BOTH FEET: ICD-10-CM

## 2019-03-19 DIAGNOSIS — R73.01 IFG (IMPAIRED FASTING GLUCOSE): ICD-10-CM

## 2019-03-19 DIAGNOSIS — M79.671 PAIN IN BOTH FEET: ICD-10-CM

## 2019-03-19 LAB
CHOLESTEROL/HDL RATIO: 5.8
CHOLESTEROL: 250 MG/DL
ESTIMATED AVERAGE GLUCOSE: 131 MG/DL
HBA1C MFR BLD: 6.2 % (ref 4.8–5.9)
HDLC SERPL-MCNC: 43 MG/DL
LDL CHOLESTEROL: 153 MG/DL (ref 0–130)
TRIGL SERPL-MCNC: 268 MG/DL
TSH SERPL DL<=0.05 MIU/L-ACNC: 0.66 MIU/L (ref 0.3–5)
VLDLC SERPL CALC-MCNC: ABNORMAL MG/DL (ref 1–30)

## 2019-03-19 PROCEDURE — 20550 NJX 1 TENDON SHEATH/LIGAMENT: CPT | Performed by: PODIATRIST

## 2019-03-19 PROCEDURE — 83036 HEMOGLOBIN GLYCOSYLATED A1C: CPT

## 2019-03-19 PROCEDURE — 99203 OFFICE O/P NEW LOW 30 MIN: CPT | Performed by: PODIATRIST

## 2019-03-19 PROCEDURE — 84443 ASSAY THYROID STIM HORMONE: CPT

## 2019-03-19 PROCEDURE — 80061 LIPID PANEL: CPT

## 2019-03-19 PROCEDURE — 36415 COLL VENOUS BLD VENIPUNCTURE: CPT

## 2019-03-19 RX ORDER — BETAMETHASONE SODIUM PHOSPHATE AND BETAMETHASONE ACETATE 3; 3 MG/ML; MG/ML
6 INJECTION, SUSPENSION INTRA-ARTICULAR; INTRALESIONAL; INTRAMUSCULAR; SOFT TISSUE ONCE
Status: COMPLETED | OUTPATIENT
Start: 2019-03-19 | End: 2019-03-19

## 2019-03-19 RX ADMIN — BETAMETHASONE SODIUM PHOSPHATE AND BETAMETHASONE ACETATE 6 MG: 3; 3 INJECTION, SUSPENSION INTRA-ARTICULAR; INTRALESIONAL; INTRAMUSCULAR; SOFT TISSUE at 12:04

## 2019-03-20 ENCOUNTER — TELEPHONE (OUTPATIENT)
Dept: INTERNAL MEDICINE | Age: 44
End: 2019-03-20

## 2019-03-20 DIAGNOSIS — E78.00 PURE HYPERCHOLESTEROLEMIA: Primary | ICD-10-CM

## 2019-03-20 DIAGNOSIS — I10 ESSENTIAL HYPERTENSION: ICD-10-CM

## 2019-03-20 RX ORDER — ATORVASTATIN CALCIUM 10 MG/1
10 TABLET, FILM COATED ORAL DAILY
Qty: 90 TABLET | Refills: 3 | Status: SHIPPED | OUTPATIENT
Start: 2019-03-20 | End: 2019-12-23 | Stop reason: SDUPTHER

## 2019-03-25 ENCOUNTER — OFFICE VISIT (OUTPATIENT)
Dept: INTERNAL MEDICINE | Age: 44
End: 2019-03-25
Payer: COMMERCIAL

## 2019-03-25 VITALS
HEART RATE: 86 BPM | SYSTOLIC BLOOD PRESSURE: 132 MMHG | HEIGHT: 72 IN | DIASTOLIC BLOOD PRESSURE: 76 MMHG | WEIGHT: 297.8 LBS | BODY MASS INDEX: 40.34 KG/M2

## 2019-03-25 DIAGNOSIS — S39.012A STRAIN OF LUMBAR REGION, INITIAL ENCOUNTER: Primary | ICD-10-CM

## 2019-03-25 PROCEDURE — 99213 OFFICE O/P EST LOW 20 MIN: CPT | Performed by: NURSE PRACTITIONER

## 2019-03-25 RX ORDER — PREDNISONE 20 MG/1
20 TABLET ORAL 2 TIMES DAILY
Qty: 10 TABLET | Refills: 0 | Status: SHIPPED | OUTPATIENT
Start: 2019-03-25 | End: 2019-03-30

## 2019-03-25 RX ORDER — TIZANIDINE 4 MG/1
4 TABLET ORAL 3 TIMES DAILY
Qty: 20 TABLET | Refills: 0 | Status: SHIPPED | OUTPATIENT
Start: 2019-03-25

## 2019-03-25 ASSESSMENT — ENCOUNTER SYMPTOMS: BACK PAIN: 1

## 2019-03-28 ENCOUNTER — TELEPHONE (OUTPATIENT)
Dept: INTERNAL MEDICINE | Age: 44
End: 2019-03-28

## 2019-03-28 ENCOUNTER — OFFICE VISIT (OUTPATIENT)
Dept: INTERNAL MEDICINE | Age: 44
End: 2019-03-28
Payer: COMMERCIAL

## 2019-03-28 ENCOUNTER — HOSPITAL ENCOUNTER (OUTPATIENT)
Dept: GENERAL RADIOLOGY | Age: 44
Discharge: HOME | End: 2019-03-30
Payer: COMMERCIAL

## 2019-03-28 VITALS
HEIGHT: 72 IN | WEIGHT: 300.2 LBS | SYSTOLIC BLOOD PRESSURE: 126 MMHG | DIASTOLIC BLOOD PRESSURE: 80 MMHG | HEART RATE: 88 BPM | BODY MASS INDEX: 40.66 KG/M2

## 2019-03-28 DIAGNOSIS — R20.0 NUMBNESS AND TINGLING OF LEFT LEG: ICD-10-CM

## 2019-03-28 DIAGNOSIS — M54.50 LUMBAR PAIN: ICD-10-CM

## 2019-03-28 DIAGNOSIS — M54.50 LUMBAR PAIN: Primary | ICD-10-CM

## 2019-03-28 DIAGNOSIS — R20.2 NUMBNESS AND TINGLING OF LEFT LEG: ICD-10-CM

## 2019-03-28 PROCEDURE — 99213 OFFICE O/P EST LOW 20 MIN: CPT | Performed by: NURSE PRACTITIONER

## 2019-03-28 PROCEDURE — 72100 X-RAY EXAM L-S SPINE 2/3 VWS: CPT

## 2019-03-31 ASSESSMENT — ENCOUNTER SYMPTOMS: BACK PAIN: 1

## 2019-04-09 ENCOUNTER — HOSPITAL ENCOUNTER (OUTPATIENT)
Dept: MRI IMAGING | Age: 44
Discharge: HOME | End: 2019-04-11
Payer: COMMERCIAL

## 2019-04-09 DIAGNOSIS — R20.2 NUMBNESS AND TINGLING OF LEFT LEG: ICD-10-CM

## 2019-04-09 DIAGNOSIS — M54.50 LUMBAR PAIN: ICD-10-CM

## 2019-04-09 DIAGNOSIS — R20.0 NUMBNESS AND TINGLING OF LEFT LEG: ICD-10-CM

## 2019-04-09 PROCEDURE — 72148 MRI LUMBAR SPINE W/O DYE: CPT

## 2019-04-10 ENCOUNTER — TELEPHONE (OUTPATIENT)
Dept: INTERNAL MEDICINE | Age: 44
End: 2019-04-10

## 2019-04-10 NOTE — TELEPHONE ENCOUNTER
Patient was in to see Zuly Jenkins for back pain and she took him off of work. He was supposed to return to work last night but, is still in PT and having back pain. Patient would like to know if you can take off of work for another 6 days until his PT is done? Please advise.

## 2019-04-10 NOTE — TELEPHONE ENCOUNTER
Spoke with pt- he works 3rd shift, so date letter to return on 4-16-19. He will  letter tomorrow. Letter typed and on your desk for signature.

## 2019-04-24 ENCOUNTER — OFFICE VISIT (OUTPATIENT)
Dept: INTERNAL MEDICINE | Age: 44
End: 2019-04-24
Payer: COMMERCIAL

## 2019-04-24 ENCOUNTER — HOSPITAL ENCOUNTER (OUTPATIENT)
Dept: LAB | Age: 44
Discharge: HOME | End: 2019-04-24
Payer: COMMERCIAL

## 2019-04-24 VITALS
BODY MASS INDEX: 40.99 KG/M2 | WEIGHT: 302.6 LBS | TEMPERATURE: 97.6 F | DIASTOLIC BLOOD PRESSURE: 80 MMHG | SYSTOLIC BLOOD PRESSURE: 136 MMHG | HEIGHT: 72 IN | HEART RATE: 106 BPM

## 2019-04-24 DIAGNOSIS — N20.0 KIDNEY STONE: ICD-10-CM

## 2019-04-24 DIAGNOSIS — I10 ESSENTIAL HYPERTENSION: ICD-10-CM

## 2019-04-24 DIAGNOSIS — D72.829 LEUKOCYTOSIS, UNSPECIFIED TYPE: ICD-10-CM

## 2019-04-24 DIAGNOSIS — R10.11 RUQ PAIN: ICD-10-CM

## 2019-04-24 DIAGNOSIS — R10.11 RUQ PAIN: Primary | ICD-10-CM

## 2019-04-24 DIAGNOSIS — G89.29 CHRONIC BILATERAL LOW BACK PAIN WITHOUT SCIATICA: ICD-10-CM

## 2019-04-24 DIAGNOSIS — M54.50 CHRONIC BILATERAL LOW BACK PAIN WITHOUT SCIATICA: ICD-10-CM

## 2019-04-24 LAB
ALBUMIN SERPL-MCNC: 4.3 G/DL (ref 3.5–5.2)
ALBUMIN/GLOBULIN RATIO: 1.2 (ref 1–2.5)
ALP BLD-CCNC: 89 U/L (ref 40–129)
ALT SERPL-CCNC: 23 U/L (ref 5–41)
ANION GAP SERPL CALCULATED.3IONS-SCNC: 14 MMOL/L (ref 9–17)
AST SERPL-CCNC: 19 U/L
BILIRUB SERPL-MCNC: 0.36 MG/DL (ref 0.3–1.2)
BUN BLDV-MCNC: 19 MG/DL (ref 6–20)
BUN/CREAT BLD: 25 (ref 9–20)
CALCIUM SERPL-MCNC: 9.4 MG/DL (ref 8.6–10.4)
CHLORIDE BLD-SCNC: 101 MMOL/L (ref 98–107)
CO2: 27 MMOL/L (ref 20–31)
CREAT SERPL-MCNC: 0.76 MG/DL (ref 0.7–1.2)
GFR AFRICAN AMERICAN: >60 ML/MIN
GFR NON-AFRICAN AMERICAN: >60 ML/MIN
GFR SERPL CREATININE-BSD FRML MDRD: ABNORMAL ML/MIN/{1.73_M2}
GFR SERPL CREATININE-BSD FRML MDRD: ABNORMAL ML/MIN/{1.73_M2}
GLUCOSE BLD-MCNC: 126 MG/DL (ref 70–99)
HCT VFR BLD CALC: 40.2 % (ref 41–53)
HEMOGLOBIN: 13.3 G/DL (ref 13.5–17.5)
MCH RBC QN AUTO: 31.3 PG (ref 26–34)
MCHC RBC AUTO-ENTMCNC: 33 G/DL (ref 31–37)
MCV RBC AUTO: 94.7 FL (ref 80–100)
NRBC AUTOMATED: ABNORMAL PER 100 WBC
PDW BLD-RTO: 11.9 % (ref 11–14.5)
PLATELET # BLD: 296 K/UL (ref 140–450)
PMV BLD AUTO: 9 FL (ref 6–12)
POTASSIUM SERPL-SCNC: 3.9 MMOL/L (ref 3.7–5.3)
RBC # BLD: 4.25 M/UL (ref 4.5–5.9)
SODIUM BLD-SCNC: 142 MMOL/L (ref 135–144)
TOTAL PROTEIN: 7.9 G/DL (ref 6.4–8.3)
WBC # BLD: 12.7 K/UL (ref 3.5–11)

## 2019-04-24 PROCEDURE — 99214 OFFICE O/P EST MOD 30 MIN: CPT | Performed by: NURSE PRACTITIONER

## 2019-04-24 PROCEDURE — 80053 COMPREHEN METABOLIC PANEL: CPT

## 2019-04-24 PROCEDURE — 36415 COLL VENOUS BLD VENIPUNCTURE: CPT

## 2019-04-24 PROCEDURE — 85027 COMPLETE CBC AUTOMATED: CPT

## 2019-04-24 ASSESSMENT — ENCOUNTER SYMPTOMS
WHEEZING: 0
COUGH: 0
RHINORRHEA: 0
CONSTIPATION: 0
VOMITING: 0
BACK PAIN: 1
CHEST TIGHTNESS: 0
BLOOD IN STOOL: 0
ABDOMINAL PAIN: 1
COLOR CHANGE: 0
SHORTNESS OF BREATH: 0
DIARRHEA: 0
FACIAL SWELLING: 0
SORE THROAT: 0
EYE PAIN: 0
SINUS PRESSURE: 0
TROUBLE SWALLOWING: 0
NAUSEA: 1
ABDOMINAL DISTENTION: 0

## 2019-04-24 NOTE — PROGRESS NOTES
04/24/19  Charli Villa  1975      Chief Complaint  1. RUQ pain    2. Leukocytosis, unspecified type    3. Chronic bilateral low back pain without sciatica    4. Kidney stone    5. Essential hypertension        HPI:  72-year-old patient with chronic low back pain, hypertension in for follow-up from [facility] ER where he was seen 4-20 3-19 for right upper quadrant pain. Patient had CT of abdomen completed. Labs completed. Reviewed. Please see scanned document. CT showed nonobstructing right renal calculus, notified her from nephrosis or hydroureter. No acute appendicitis. Small fat-containing right inguinal hernia. ER physician wanted to complete right upper quadrant ultrasound however patient declined because he was in a car pool and he needed to be getting home. Informed the ER physician he would follow-up with his PCPs office for further evaluation. Noticed right upper quadrant pain became severe after eating at work. 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 No chest pain. Denies any flank pain. No difficulty with bowel or bladder. No fevers. Pain is improved today. Rates it at a 3-4 on a 1-10 scale. Deep pain. Worse after eating. Patient with recent significant low back pain. Seen in office. Given prednisone. He did follow-up with pain management doctor. Did get \"steroid injections into his back\" and states 85-90% better. Blood pressure has been well controlled. Denies any lightheadedness or dizziness. No Known Allergies    Past Medical History:   Diagnosis Date    Abnormal MRI of the head 02/2015    rosemary Sanderson incidental finding refered to [facility] rec 1 yr f/u MR    ED (erectile dysfunction)     Esophageal ulcer     EGD, Dr. Snow Candelario, with esophageal ulcer and some inflammation on biopsy, 05/11.      Fatty liver     incidental finding on CT 2/18    GERD (gastroesophageal reflux disease)     History of stress test     11/16  10.1 mets negative images    Hyperlipidemia     Hypertension     Previously treated with Norvasc.  Hypogonadism in male 11/14/2016    Dr Missael Marin felt Secondary - see endo consult notes in media    Hypothyroidism     previous h/o incresed TSH, then nl off meds    IFG (impaired fasting glucose)     Lung granuloma (HCC)     1 cm calcified granuloma R perihilar,  CT lung OK 2/18    Migraine headache     remote history of    Obesity     Post laminectomy syndrome     Following surgery [facility]  09/12. Epidurals were also done March 2012 and July 2012 by Dr. Lissa Blevins. MRI 11/16    Tobacco abuse        Past Surgical History:   Procedure Laterality Date    COLONOSCOPY  05/19/11    LUMBAR SPINE SURGERY  09/12    Lumbosacral spine procedure, Dr. Ada Ortez. [address]    Procedure for hypospadias.  OTHER SURGICAL HISTORY  03/22/04    Ureterocutaneous fistula repair.  OTHER SURGICAL HISTORY  08/26/05    Bimalleolar fracture repair with open reduction and internal fixation.  OTHER SURGICAL HISTORY  07/17/12, 3/20/12    Interlaminar epidural steroid injection.  OTHER SURGICAL HISTORY Bilateral 7/17/15, 2/20/15, 11/2014, 8/1/14, 4/24/14    L5 TFE    OTHER SURGICAL HISTORY Bilateral 1/08/16    L5 TFE    OTHER SURGICAL HISTORY Bilateral 07/01/2016    bilateral TFE    UMBILICAL HERNIA REPAIR  02/18/2016    with mesh     UPPER GASTROINTESTINAL ENDOSCOPY  05/19/11       Current Outpatient Medications on File Prior to Visit   Medication Sig Dispense Refill    tiZANidine (ZANAFLEX) 4 MG tablet Take 1 tablet by mouth 3 times daily 20 tablet 0    atorvastatin (LIPITOR) 10 MG tablet Take 1 tablet by mouth daily 90 tablet 3    tadalafil (CIALIS) 20 MG tablet Take 1 tablet by mouth as needed for Erectile Dysfunction 36 tablet 3    oxyCODONE-acetaminophen (PERCOCET) 7.5-325 MG per tablet Take 1 tablet by mouth 4 times daily as needed.   0    lisinopril-hydrochlorothiazide (PRINZIDE;ZESTORETIC) 10-12.5 MG per tablet TAKE 1 TABLET DAILY (Patient taking differently: Take 2 tablets by mouth daily TAKE 1 TABLET DAILY) 90 tablet 3    pregabalin (LYRICA) 150 MG capsule Take 1 capsule by mouth 3 times daily 270 capsule 1    Tens Unit MISC by Does not apply route. Pt needs eval for TENS unit replacement. The one he had ( with the back brace) was stolen recently. 1 each 0     Current Facility-Administered Medications on File Prior to Visit   Medication Dose Route Frequency Provider Last Rate Last Dose    testosterone cypionate (DEPOTESTOTERONE CYPIONATE) injection 200 mg  200 mg Intramuscular Q21 Days Alexa Brittle, MD   200 mg at 11/18/14 6752       Social History     Socioeconomic History    Marital status:      Spouse name: Not on file    Number of children: 1    Years of education: Not on file    Highest education level: Not on file   Occupational History     Employer: [employer]   Social Needs    Financial resource strain: Not on file    Food insecurity:     Worry: Not on file     Inability: Not on file    Transportation needs:     Medical: Not on file     Non-medical: Not on file   Tobacco Use    Smoking status: Current Every Day Smoker     Packs/day: 0.50     Years: 5.00     Pack years: 2.50     Types: Cigarettes    Smokeless tobacco: Never Used   Substance and Sexual Activity    Alcohol use: Yes     Alcohol/week: 0.0 oz     Comment: Denies current use, 8/15/17.   occasionally    Drug use: No    Sexual activity: Yes     Partners: Female   Lifestyle    Physical activity:     Days per week: Not on file     Minutes per session: Not on file    Stress: Not on file   Relationships    Social connections:     Talks on phone: Not on file     Gets together: Not on file     Attends Spiritism service: Not on file     Active member of club or organization: Not on file     Attends meetings of clubs or organizations: Not on file Relationship status: Not on file    Intimate partner violence:     Fear of current or ex partner: Not on file     Emotionally abused: Not on file     Physically abused: Not on file     Forced sexual activity: Not on file   Other Topics Concern    Not on file   Social History Narrative    Not on file       Review of Systems   Constitutional: Negative for activity change, appetite change, chills, fatigue, fever and unexpected weight change. HENT: Negative for congestion, dental problem, ear discharge, ear pain, facial swelling, hearing loss, postnasal drip, rhinorrhea, sinus pressure, sore throat and trouble swallowing. Eyes: Negative for pain and visual disturbance. Respiratory: Negative for cough, chest tightness, shortness of breath and wheezing. Cardiovascular: Negative for chest pain, palpitations and leg swelling. Gastrointestinal: Positive for abdominal pain (Right upper quadrant discomfort) and nausea (when pain is severe ). Negative for abdominal distention, blood in stool, constipation, diarrhea and vomiting. Endocrine: Negative for cold intolerance, heat intolerance and polyuria. Genitourinary: Negative for difficulty urinating. Musculoskeletal: Positive for back pain (chronic, follows  with pain management). Negative for arthralgias, gait problem, myalgias, neck pain and neck stiffness. Skin: Negative for color change, rash and wound. Neurological: Negative for dizziness, tremors, seizures, weakness, light-headedness, numbness and headaches. Psychiatric/Behavioral: Negative for confusion and hallucinations. The patient is not nervous/anxious. Physical Exam   Constitutional: He is oriented to person, place, and time. He appears well-developed and well-nourished. No distress. HENT:   Head: Normocephalic and atraumatic. Right Ear: External ear normal.   Left Ear: External ear normal.   Eyes: Pupils are equal, round, and reactive to light.  Conjunctivae, EOM and lids are normal. Right eye exhibits no discharge. Left eye exhibits no discharge. Neck: Normal range of motion. Neck supple. No tracheal deviation, no edema and no erythema present. Cardiovascular: Normal rate, regular rhythm, normal heart sounds and intact distal pulses. Exam reveals no gallop and no friction rub. No murmur heard. Pulmonary/Chest: Effort normal and breath sounds normal. No accessory muscle usage. No respiratory distress. He has no wheezes. He has no rales. Abdominal: Soft. Bowel sounds are normal. He exhibits no distension, no ascites and no mass. There is no hepatosplenomegaly. There is tenderness in the right upper quadrant. There is no rigidity, no rebound and no guarding. No hernia. Obese     Musculoskeletal: Normal range of motion. He exhibits no edema. Neurological: He is alert and oriented to person, place, and time. No cranial nerve deficit or sensory deficit. Coordination normal.   Skin: Skin is warm and dry. Capillary refill takes less than 2 seconds. No rash noted. He is not diaphoretic. No erythema. No pallor. Psychiatric: He has a normal mood and affect. His behavior is normal. Judgment and thought content normal.   Nursing note and vitals reviewed. Vitals:    04/24/19 1004   BP: 136/80   Site: Left Upper Arm   Position: Sitting   Cuff Size: Large Adult   Pulse: 106   Temp: 97.6 °F (36.4 °C)   TempSrc: Tympanic   Weight: (!) 302 lb 9.6 oz (137.3 kg)   Height: 6' (1.829 m)       Assessment:  1. RUQ pain  [place] diet   - US GALLBLADDER RUQ; Future  - CBC; Future  - Comprehensive Metabolic Panel; Future    2. Leukocytosis, unspecified type  Follow up labs from ER - mildly elevated WBC- ? related to recent  steroids    - CBC; Future  - Comprehensive Metabolic Panel; Future    3. Chronic bilateral low back pain without sciatica  Stable ongoing follow up with pain management     4. Kidney stone  non obstructing     5.  Essential hypertension  Stable  Continue to monitor Plan:  As noted above. Er records reviewed   Follow up for routine visit. Call sooner with concerns prior.     Electronically signed by ASHA Glez CNP on 4/24/2019 at 10:25 AM

## 2019-04-26 ENCOUNTER — HOSPITAL ENCOUNTER (OUTPATIENT)
Dept: INTERVENTIONAL RADIOLOGY/VASCULAR | Age: 44
Discharge: HOME | End: 2019-04-28
Payer: COMMERCIAL

## 2019-04-26 DIAGNOSIS — R10.11 RUQ PAIN: ICD-10-CM

## 2019-04-26 PROCEDURE — 76705 ECHO EXAM OF ABDOMEN: CPT

## 2019-05-14 ENCOUNTER — TELEPHONE (OUTPATIENT)
Dept: PAIN MANAGEMENT | Age: 44
End: 2019-05-14

## 2019-05-14 ENCOUNTER — OFFICE VISIT (OUTPATIENT)
Dept: INTERNAL MEDICINE | Age: 44
End: 2019-05-14
Payer: COMMERCIAL

## 2019-05-14 ENCOUNTER — OFFICE VISIT (OUTPATIENT)
Dept: PODIATRY | Age: 44
End: 2019-05-14
Payer: COMMERCIAL

## 2019-05-14 ENCOUNTER — HOSPITAL ENCOUNTER (OUTPATIENT)
Dept: GENERAL RADIOLOGY | Age: 44
Discharge: HOME | End: 2019-05-16
Payer: COMMERCIAL

## 2019-05-14 VITALS
SYSTOLIC BLOOD PRESSURE: 128 MMHG | BODY MASS INDEX: 40.9 KG/M2 | HEIGHT: 72 IN | HEART RATE: 96 BPM | WEIGHT: 302 LBS | DIASTOLIC BLOOD PRESSURE: 88 MMHG

## 2019-05-14 VITALS
HEIGHT: 72 IN | WEIGHT: 302 LBS | HEART RATE: 96 BPM | SYSTOLIC BLOOD PRESSURE: 128 MMHG | BODY MASS INDEX: 40.9 KG/M2 | DIASTOLIC BLOOD PRESSURE: 88 MMHG

## 2019-05-14 DIAGNOSIS — D64.9 ANEMIA, UNSPECIFIED TYPE: ICD-10-CM

## 2019-05-14 DIAGNOSIS — M96.1 POST LAMINECTOMY SYNDROME: ICD-10-CM

## 2019-05-14 DIAGNOSIS — I10 ESSENTIAL HYPERTENSION: ICD-10-CM

## 2019-05-14 DIAGNOSIS — Z72.0 TOBACCO ABUSE: Primary | ICD-10-CM

## 2019-05-14 DIAGNOSIS — S90.212A CONTUSION OF LEFT GREAT TOE WITH DAMAGE TO NAIL, INITIAL ENCOUNTER: Primary | ICD-10-CM

## 2019-05-14 DIAGNOSIS — K76.0 FATTY LIVER: ICD-10-CM

## 2019-05-14 DIAGNOSIS — E03.9 HYPOTHYROIDISM, UNSPECIFIED TYPE: ICD-10-CM

## 2019-05-14 DIAGNOSIS — R93.0 ABNORMAL MRI OF THE HEAD: ICD-10-CM

## 2019-05-14 DIAGNOSIS — R73.01 IFG (IMPAIRED FASTING GLUCOSE): ICD-10-CM

## 2019-05-14 DIAGNOSIS — S90.422A BLISTER OF LEFT GREAT TOE, INITIAL ENCOUNTER: ICD-10-CM

## 2019-05-14 DIAGNOSIS — G89.29 CHRONIC BILATERAL LOW BACK PAIN WITHOUT SCIATICA: ICD-10-CM

## 2019-05-14 DIAGNOSIS — M79.675 PAIN OF LEFT GREAT TOE: ICD-10-CM

## 2019-05-14 DIAGNOSIS — M54.50 CHRONIC BILATERAL LOW BACK PAIN WITHOUT SCIATICA: ICD-10-CM

## 2019-05-14 DIAGNOSIS — E78.00 PURE HYPERCHOLESTEROLEMIA: ICD-10-CM

## 2019-05-14 DIAGNOSIS — E66.01 OBESITY, MORBID, BMI 40.0-49.9 (HCC): ICD-10-CM

## 2019-05-14 PROCEDURE — 99213 OFFICE O/P EST LOW 20 MIN: CPT | Performed by: PODIATRIST

## 2019-05-14 PROCEDURE — 73630 X-RAY EXAM OF FOOT: CPT

## 2019-05-14 PROCEDURE — 99214 OFFICE O/P EST MOD 30 MIN: CPT | Performed by: INTERNAL MEDICINE

## 2019-05-14 RX ORDER — VARENICLINE TARTRATE 0.5 MG/1
.5-1 TABLET, FILM COATED ORAL SEE ADMIN INSTRUCTIONS
Qty: 57 TABLET | Refills: 0 | Status: SHIPPED | OUTPATIENT
Start: 2019-05-14 | End: 2019-11-26 | Stop reason: ALTCHOICE

## 2019-05-14 RX ORDER — VARENICLINE TARTRATE 1 MG/1
1 TABLET, FILM COATED ORAL 2 TIMES DAILY
Qty: 60 TABLET | Refills: 1 | Status: SHIPPED | OUTPATIENT
Start: 2019-05-14 | End: 2019-11-26

## 2019-05-14 NOTE — PROGRESS NOTES
Rashida Yepez received counseling on the following healthy behaviors: tobacco cessation  Reviewed prior labs and health maintenance  Continue current medications except where noted below, diet and exercise. Discussed use, benefit, and side effects of prescribed medications. Barriers to medication compliance addressed. Patient given educational materials - see patient instructions  Was a self-tracking handout given in paper form or via [employer]? Yes    Requested Prescriptions      No prescriptions requested or ordered in this encounter       All patient questions answered. Patient voiced understanding. Quality Measures    Body mass index is 40.96 kg/m². Elevated. Weight control planned discussed: Healthy diet and regular exercise    BP: 128/88 Blood pressure is normal. Treatment plan consists of: see progress note below.       Lab Results   Component Value Date    LDLCHOLESTEROL 153 (H) 03/19/2019    (goal LDL reduction with dx if diabetes is 50% LDL reduction)      PHQ Scores 3/13/2019 2/27/2018 11/14/2016   PHQ2 Score 0 0 0   PHQ9 Score 0 0 0     Interpretation of Total Score Depression Severity: 1-4 = Minimal depression, 5-9 = Mild depression, 10-14 = Moderate depression, 15-19 = Moderately severe depression, 20-27 = Severe depression

## 2019-05-14 NOTE — TELEPHONE ENCOUNTER
The patient was terminated in December of 2016.      \"The patient has violated the Controlled Substance Treatment Guidelines as follows:  Multiple occurrences of overtaking opiates: 12/3/14, 3/17/15, 1/25/16, 12/22/16\"

## 2019-05-14 NOTE — PATIENT INSTRUCTIONS

## 2019-05-14 NOTE — PROGRESS NOTES
The 10-year ASCVD risk score (Steven Sanches et al., 2013) is: 8.8%    Values used to calculate the score:      Age: 41 years      Sex: Male      Is Non- : No      Diabetic: No      Tobacco smoker: Yes      Systolic Blood Pressure: 945 mmHg      Is BP treated: Yes      HDL Cholesterol: 43 mg/dL      Total Cholesterol: 250 mg/dL

## 2019-05-14 NOTE — PATIENT INSTRUCTIONS
Discontinue previous dressing. Apply Silvadene, an antimicrobial cream which is a prescription medicine. Apply once or twice a day to your wound as instructed by your doctor. Cover with a dry dressing. Call with any concerns. SIGNS OF INFECTION  - Redness, swelling, skin hot  - Wound bed turns black or stringy yellow  - Foul odor  - Increased drainage or pus  - Increased pain  - Fever greater than 100F    CALL YOUR DOCTOR OR SEEK MEDICAL ATTENTION IF SIGNS OF INFECTION.   DO NOT WAIT UNTIL YOUR NEXT APPOINTMENT    Call the Wound Care Nurse with any other questions or concerns- [phone]

## 2019-05-14 NOTE — PROGRESS NOTES
Subjective:  Ashley Calix is a 41 y.o. male who presents to the office today complaining of hitting L big toe while at work. Symptoms began 1 day(s) ago. Patient relates pain is Present. Pain is rated 3 out of 10 and is described as mild. Treatments prior to today's visit include: none. Currently denies F/C/N/V. No Known Allergies    Past Medical History:   Diagnosis Date    Abnormal MRI of the head 02/2015    rosemary Pak eval incidental finding refered to [facility] rec 1 yr f/u MR    ED (erectile dysfunction)     Esophageal ulcer     EGD, Dr. Rito Vasquez, with esophageal ulcer and some inflammation on biopsy, 05/11.  Fatty liver     incidental finding on CT 2/18    GERD (gastroesophageal reflux disease)     History of stress test     11/16  10.1 mets negative images    Hyperlipidemia     Hypertension     Previously treated with Norvasc.  Hypogonadism in male 11/14/2016    Dr Debora Betts felt Secondary - see endo consult notes in media    Hypothyroidism     previous h/o incresed TSH, then nl off meds    IFG (impaired fasting glucose)     Lung granuloma (HCC)     1 cm calcified granuloma R perihilar,  CT lung OK 2/18    Migraine headache     remote history of    Obesity     Post laminectomy syndrome     Following surgery [facility]  09/12. Epidurals were also done March 2012 and July 2012 by Dr. Dell Michele. MRI 11/16    Tobacco abuse        Prior to Admission medications    Medication Sig Start Date End Date Taking?  Authorizing Provider   varenicline (CHANTIX) 1 MG tablet Take 1 tablet by mouth 2 times daily 5/14/19  Yes Amna Bailey MD   varenicline (CHANTIX) 0.5 MG tablet Take 1-2 tablets by mouth See Admin Instructions 0.5mg DAILY for 3 days followed by 0.5mg TWICE DAILY for 4 days followed by 1mg TWICE DAILY 5/14/19  Yes Amna Bailey MD   tiZANidine (ZANAFLEX) 4 MG tablet Take 1 tablet by mouth 3 times daily 3/25/19  Yes Lynnette Springer APRN - CNP   atorvastatin (LIPITOR) 10 MG tablet Take 1 tablet by mouth daily 3/20/19  Yes Kyle Shoemaker MD   tadalafil (CIALIS) 20 MG tablet Take 1 tablet by mouth as needed for Erectile Dysfunction 3/13/19  Yes Kyle Shoemaker MD   oxyCODONE-acetaminophen (PERCOCET) 7.5-325 MG per tablet Take 1 tablet by mouth 4 times daily as needed. 2/2/18  Yes Historical Provider, MD   lisinopril-hydrochlorothiazide (PRINZIDE;ZESTORETIC) 10-12.5 MG per tablet TAKE 1 TABLET DAILY  Patient taking differently: Take 2 tablets by mouth daily TAKE 1 TABLET DAILY 1/10/18  Yes Kyle Shoemaker MD   pregabalin (LYRICA) 150 MG capsule Take 1 capsule by mouth 3 times daily 9/20/16  Yes ASHA Jeffries - CNP   Tens Unit MISC by Does not apply route. Pt needs eval for TENS unit replacement. The one he had ( with the back brace) was stolen recently. 3/9/15  Yes Domenico Paz MD       Past Surgical History:   Procedure Laterality Date    COLONOSCOPY  05/19/11    LUMBAR SPINE SURGERY  09/12    Lumbosacral spine procedure, Dr. Celi Daly. [address]    Procedure for hypospadias.  OTHER SURGICAL HISTORY  03/22/04    Ureterocutaneous fistula repair.  OTHER SURGICAL HISTORY  08/26/05    Bimalleolar fracture repair with open reduction and internal fixation.  OTHER SURGICAL HISTORY  07/17/12, 3/20/12    Interlaminar epidural steroid injection.     OTHER SURGICAL HISTORY Bilateral 7/17/15, 2/20/15, 11/2014, 8/1/14, 4/24/14    L5 TFE    OTHER SURGICAL HISTORY Bilateral 1/08/16    L5 TFE    OTHER SURGICAL HISTORY Bilateral 07/01/2016    bilateral TFE    UMBILICAL HERNIA REPAIR  02/18/2016    with mesh     UPPER GASTROINTESTINAL ENDOSCOPY  05/19/11       Family History   Problem Relation Age of Onset    Arthritis Father     Diabetes Paternal Grandfather        Social History     Tobacco Use    Smoking status: Current Every Day Smoker     Packs/day: 0.50     Years: 5.00     Pack years: 2.50     Types: Cigarettes    Smokeless tobacco: Never Used   Substance Use Topics    Alcohol use: Yes     Alcohol/week: 0.0 oz     Comment: Denies current use, 8/15/17. occasionally       Review of Systems: All 12 systems reviewed and pertinent positives noted above. Lower Extremity Physical Examination:     Vitals:   Vitals:    05/14/19 0924   BP: 128/88   Pulse: 96     General: AAO x 3 in NAD. Vascular: DP and PT pulses palpable 2/4, bilateral.  CFT <3 seconds, bilateral.  Hair growth present to the level of the digits, bilateral.  Edema absent, bilateral.  Varicosities absent, bilateral. Erythema absent, bilateral. Distal Rubor absent bilateral.  Temperature within normal limits bilateral. Hyperpigmentation absent bilateral. No atrophic skin. Neurological: Sensation intact to light touch to level of digits, bilateral.  Protective sensation intact 10/10 sites via 5.07/10g [place] Monofilament, bilateral.  negative Tinel's, bilateral.  negative Valleix sign, bilateral.  Vibratory intact bilateral.  Reflexes Decreased bilateral.  Paresthesias negative. Dysthesias negative. Sharp/dull intact bilateral.  Musculoskeletal: Muscle strength 5/5, bilateral.  Pain present upon palpation L hallux distally nail bed region. Normal medial longitudinal arch, bilateral.  Ankle ROM decreased,bilateral.  1st MPJ ROM within normal limits, bilateral.  Dorsally contracted digits absent. No other foot deformities. Integument: Warm, dry, supple, bilateral.  Open lesion absent, bilateral. Distal blood blister subungual aspect, very mild showing at proximal edge, L hallux nail bed very stable at this point. No signs of infx on periphery. Interdigital maceration absent to web spaces bilateral.  Nails within normal limits. Fissures absent, bilateral. Hyperkeratotic tissue is absent. Asessment: Patient is a 41 y.o. male with:    Diagnosis Orders   1. Contusion of left great toe with damage to nail, initial encounter     2.  Pain of left great toe  XR FOOT LEFT (MIN 3

## 2019-05-20 NOTE — PROGRESS NOTES
Sterling Villa  YOB: 1975    Date of Service:  5/14/2019      HPI:  Charline Higgins was seen in the internal medicine office today for:  Chief Complaint  Patient presents with  · Tobacco abuse. · Back pain. · Hypertension. · Dyslipidemia. · Blood sugar problem. · Weight problem. · and continued evaluation and management of chronic medical problems. We addressed the following new, acute or uncontrolled/unstable issues:    1. We talked about his tobacco abuse. He has a lot of questions about this. We talked about Chantix as a reasonable option. We discussed the risks and benefits of this. He feels strongly he would like to go ahead with this. We discussed side effects. We addressed the following chronic issues:    · We talked about his back. He continues to follow with the pain specialist and appears to be reasonably stable. · Hypertension  - He is doing well with his antihypertensive meds. No chest pain, dizziness or palpitations. He has done better on the increased dose of the antihypertensive. Blood pressure looks good today. He is not having chest pain, dyspnea, dizziness, or palpitation. · His right upper quadrant symptoms have improved. We reviewed the ultrasound that was done about a month or so ago. · Dyslipidemia  - He is not having any difficulty with his cholesterol medications. No significant myalgias. We have gotten him started on Lipitor and he has done fine with that. · Impaired fasting glucose  - We discussed diet and activity level as it relates to blood sugars and he is working on this. · Obesity  - Discussed diet, exercise,  and various strategies for weight loss. Review of Systems:  Constitutional:  Negative for chills, fever, and weight loss. HENT:  Negative for congestion, ear pain, and sore throat. Eyes:  Negative for blurred vision, double vision, pain and discharge.   Respiratory:  Negative for cough, shortness of breath, and wheezing. Cardiovascular:  Negative for chest pain, palpitations, and PND. Gastrointestinal:  Negative for abdominal pain, blood in stool, constipation, diarrhea, nausea and vomiting. Genitourinary:  Negative for dysuria, frequency, and hematuria. Musculoskeletal:  Negative for myalgias. Positive for back pain. Skin:  Negative for rash. Neurological:  Negative for tingling, sensory change, speech change, focal weakness, seizures, and headaches. Endo/Heme/Allergies:  Does not bruise/bleed easily. Psychiatric/Behavioral:  Negative for hallucinations and suicidal ideas. Patient Active Problem List   Diagnosis    Hyperlipidemia    Tobacco abuse    Hypertension    Hypothyroidism    ED (erectile dysfunction)    Post laminectomy syndrome    IFG (impaired fasting glucose)    Anxiety    Arthritis of ankle joint    Hypogonadism in male    Abnormal MRI of the head    Incisional hernia, without obstruction or gangrene    Obesity, morbid, BMI 40.0-49.9 (HCC)    GERD (gastroesophageal reflux disease)    Fatty liver       Medications:    Current Outpatient Medications   Medication Sig Dispense Refill    varenicline (CHANTIX) 1 MG tablet Take 1 tablet by mouth 2 times daily 60 tablet 1    varenicline (CHANTIX) 0.5 MG tablet Take 1-2 tablets by mouth See Admin Instructions 0.5mg DAILY for 3 days followed by 0.5mg TWICE DAILY for 4 days followed by 1mg TWICE DAILY 57 tablet 0    tiZANidine (ZANAFLEX) 4 MG tablet Take 1 tablet by mouth 3 times daily 20 tablet 0    atorvastatin (LIPITOR) 10 MG tablet Take 1 tablet by mouth daily 90 tablet 3    tadalafil (CIALIS) 20 MG tablet Take 1 tablet by mouth as needed for Erectile Dysfunction 36 tablet 3    oxyCODONE-acetaminophen (PERCOCET) 7.5-325 MG per tablet Take 1 tablet by mouth 4 times daily as needed.   0    lisinopril-hydrochlorothiazide (PRINZIDE;ZESTORETIC) 10-12.5 MG per tablet TAKE 1 TABLET DAILY (Patient taking differently: Take 2 diaphoretic. No pallor. Psychiatric:  He has a normal mood and affect. Her behavior is normal.  Judgment normal.       Lab Results   Component Value Date    K 3.9 04/24/2019    CREATININE 0.76 04/24/2019    AST 19 04/24/2019    ALT 23 04/24/2019    TSH 0.66 03/19/2019    HCT 40.2 04/24/2019    LABA1C 6.2 03/19/2019    PSA 0.52 02/28/2018    GLUCOSE 126 04/24/2019    CALCIUM 9.4 04/24/2019    BJRENURC06 324 03/27/2018      Lab Results   Component Value Date    CHOL 250 03/19/2019    TRIG 268 03/19/2019    HDL 43 03/19/2019    LDLCHOLESTEROL 153 03/19/2019       Labs reviewed and discussed with the patient? Yes    Assessment/Plan:    1. Tobacco abuse  Counseled on cessation. Prescription for Chantix starter pack plus refills. We discussed followup. We discussed my departure from [facility] [address] and possible options for further followup. He is going to let us know if any issues with the medication as well. 2. Essential hypertension  Stable. Continue to monitor. 3. Pure hypercholesterolemia  Stable. Continue to monitor. Doing well after starting the Lipitor.  - Lipid Panel; Future  - ALT; Future  - AST; Future  - CK; Future    4. Anemia, unspecified type  This was noted incidentally with the labs that were done last month. Repeat CBC pending. If this is persistent it is going to require further evaluation. This was mild with a hematocrit of 40.2.  - CBC Auto Differential; Future    5. Chronic bilateral low back pain without sciatica  6. Post laminectomy syndrome  Stable. Following with the pain specialist.    7. Obesity, morbid, BMI 40.0-49.9 ([address]  We discussed diet, exercise and weight loss. 8. Hypothyroidism, unspecified type  TSH looked good in March. Not on medicine. 9. Fatty liver  We discussed the importance of weight loss. He has ultrasound evidence of steatosis. Continue to monitor.     10. Abnormal MRI of the head  He did get in to see Dr. Teagan Wilson, the neurologist in [facility], who recommended a repeat MRI at a period of 3 to 5 years. He does not have symptoms related to this. 11. IFG (impaired fasting glucose)  Continue to work on diet. Continue to monitor. 12.  He also had an injury to his left great toe where he has bled underneath the toenail. I have recommended evaluation with Dr. Sb Mayes. He will call if any problems prior to return. Orders Placed This Encounter   Medications    varenicline (CHANTIX) 1 MG tablet     Sig: Take 1 tablet by mouth 2 times daily     Dispense:  60 tablet     Refill:  1    varenicline (CHANTIX) 0.5 MG tablet     Sig: Take 1-2 tablets by mouth See Admin Instructions 0.5mg DAILY for 3 days followed by 0.5mg TWICE DAILY for 4 days followed by 1mg TWICE DAILY     Dispense:  57 tablet     Refill:  0       I, Tomasz Diaz, fabiola personally transcribing for Kyle Shoemaker MD 5/20/19 at 11:12 AM.    Alexis Gama MD, personally performed the services described in this document as transcribed by Tomasz Diaz, and it is both accurate and complete.     Electronically signed by Kyle Shoemaker MD 5/20/2019 at 2:46 PM.

## 2019-05-23 ENCOUNTER — PROCEDURE VISIT (OUTPATIENT)
Dept: PODIATRY | Age: 44
End: 2019-05-23
Payer: COMMERCIAL

## 2019-05-23 VITALS
WEIGHT: 301 LBS | HEART RATE: 80 BPM | DIASTOLIC BLOOD PRESSURE: 80 MMHG | HEIGHT: 72 IN | SYSTOLIC BLOOD PRESSURE: 130 MMHG | BODY MASS INDEX: 40.77 KG/M2

## 2019-05-23 DIAGNOSIS — S90.112D CONTUSION OF LEFT GREAT TOE WITHOUT DAMAGE TO NAIL, SUBSEQUENT ENCOUNTER: Primary | ICD-10-CM

## 2019-05-23 DIAGNOSIS — S90.422A BLISTER OF LEFT GREAT TOE, INITIAL ENCOUNTER: ICD-10-CM

## 2019-05-23 PROCEDURE — 99213 OFFICE O/P EST LOW 20 MIN: CPT | Performed by: PODIATRIST

## 2019-05-23 NOTE — PROGRESS NOTES
Subjective:  Yee Lance is a 41 y.o. male who presents to the office today complaining of L big toe injury. Symptoms improved. Patient relates pain is absent. Pain is rated 0 out of 10 and is described as none. Pt interested in xray results. No issues with tx at home. Currently denies F/C/N/V. No Known Allergies    Past Medical History:   Diagnosis Date    Abnormal MRI of the head 02/2015    Nam Ruiz, rosemary eval incidental finding refered to [facility] rec 1 yr f/u MR    ED (erectile dysfunction)     Esophageal ulcer     EGD, Dr. Xiang Hollis, with esophageal ulcer and some inflammation on biopsy, 05/11.  Fatty liver     incidental finding on CT 2/18    GERD (gastroesophageal reflux disease)     History of stress test     11/16  10.1 mets negative images    Hyperlipidemia     Hypertension     Previously treated with Norvasc.  Hypogonadism in male 11/14/2016    Dr Nam Ruiz felt Secondary - see endo consult notes in media    Hypothyroidism     previous h/o incresed TSH, then nl off meds    IFG (impaired fasting glucose)     Lung granuloma (HCC)     1 cm calcified granuloma R perihilar,  CT lung OK 2/18    Migraine headache     remote history of    Obesity     Post laminectomy syndrome     Following surgery [facility]  09/12. Epidurals were also done March 2012 and July 2012 by Dr. Azalia Pandya. MRI 11/16    Tobacco abuse        Prior to Admission medications    Medication Sig Start Date End Date Taking?  Authorizing Provider   varenicline (CHANTIX) 1 MG tablet Take 1 tablet by mouth 2 times daily 5/14/19  Yes Tobi Shaw MD   varenicline (CHANTIX) 0.5 MG tablet Take 1-2 tablets by mouth See Admin Instructions 0.5mg DAILY for 3 days followed by 0.5mg TWICE DAILY for 4 days followed by 1mg TWICE DAILY 5/14/19  Yes Tobi Shaw MD   tiZANidine (ZANAFLEX) 4 MG tablet Take 1 tablet by mouth 3 times daily 3/25/19  Yes Jenetta Essex, APRN - CNP   atorvastatin (LIPITOR) 10 MG tablet Take 1 tablet by mouth daily 3/20/19  Yes Vernell Kidd MD   tadalafil (CIALIS) 20 MG tablet Take 1 tablet by mouth as needed for Erectile Dysfunction 3/13/19  Yes Vernell Kidd MD   oxyCODONE-acetaminophen (PERCOCET) 7.5-325 MG per tablet Take 1 tablet by mouth 4 times daily as needed. 2/2/18  Yes Historical Provider, MD   lisinopril-hydrochlorothiazide (PRINZIDE;ZESTORETIC) 10-12.5 MG per tablet TAKE 1 TABLET DAILY  Patient taking differently: Take 2 tablets by mouth daily TAKE 1 TABLET DAILY 1/10/18  Yes Vernell Kidd MD   pregabalin (LYRICA) 150 MG capsule Take 1 capsule by mouth 3 times daily 9/20/16  Yes ASHA Perry - CNP   Tens Unit MISC by Does not apply route. Pt needs eval for TENS unit replacement. The one he had ( with the back brace) was stolen recently. 3/9/15  Yes Ysabel Borges MD       Past Surgical History:   Procedure Laterality Date    COLONOSCOPY  05/19/11    LUMBAR SPINE SURGERY  09/12    Lumbosacral spine procedure, Dr. Angie Schwartz. [address]    Procedure for hypospadias.  OTHER SURGICAL HISTORY  03/22/04    Ureterocutaneous fistula repair.  OTHER SURGICAL HISTORY  08/26/05    Bimalleolar fracture repair with open reduction and internal fixation.  OTHER SURGICAL HISTORY  07/17/12, 3/20/12    Interlaminar epidural steroid injection.     OTHER SURGICAL HISTORY Bilateral 7/17/15, 2/20/15, 11/2014, 8/1/14, 4/24/14    L5 TFE    OTHER SURGICAL HISTORY Bilateral 1/08/16    L5 TFE    OTHER SURGICAL HISTORY Bilateral 07/01/2016    bilateral TFE    UMBILICAL HERNIA REPAIR  02/18/2016    with mesh     UPPER GASTROINTESTINAL ENDOSCOPY  05/19/11       Family History   Problem Relation Age of Onset    Arthritis Father     Diabetes Paternal Grandfather        Social History     Tobacco Use    Smoking status: Current Every Day Smoker     Packs/day: 0.50     Years: 5.00     Pack years: 2.50     Types: Cigarettes    Smokeless tobacco: Never Used   Substance Use Topics    Alcohol use: Yes     Alcohol/week: 0.0 oz     Comment: Denies current use, 8/15/17. occasionally       Review of Systems: All 12 systems reviewed and pertinent positives noted above. Lower Extremity Physical Examination:     Vitals:   Vitals:    05/23/19 0812   BP: 130/80   Pulse: 80     General: AAO x 3 in NAD. Vascular: DP and PT pulses palpable 2/4, bilateral.  CFT <3 seconds, bilateral.  Hair growth present to the level of the digits, bilateral.  Edema absent, bilateral.  Varicosities absent, bilateral. Erythema absent, bilateral. Distal Rubor absent bilateral.  Temperature within normal limits bilateral. Hyperpigmentation absent bilateral. No atrophic skin. Neurological: Sensation intact to light touch to level of digits, bilateral.  Protective sensation intact 10/10 sites via 5.07/10g [place] Monofilament, bilateral.  negative Tinel's, bilateral.  negative Valleix sign, bilateral.  Vibratory intact bilateral.  Reflexes Decreased bilateral.  Paresthesias negative. Dysthesias negative. Sharp/dull intact bilateral.  Musculoskeletal: Muscle strength 5/5, bilateral.  Pain present upon palpation L hallux distally nail bed region. Normal medial longitudinal arch, bilateral.  Ankle ROM decreased,bilateral.  1st MPJ ROM within normal limits, bilateral.  Dorsally contracted digits absent. No other foot deformities. Integument: Warm, dry, supple, bilateral.  Open lesion absent, bilateral. Distal blood blister subungual aspect, resolved. No active wound. No paronychia formed at proximal edge, L hallux nail bed very stable at this point. No signs of infx on periphery. Interdigital maceration absent to web spaces bilateral.  Nails within normal limits. Fissures absent, bilateral. Hyperkeratotic tissue is absent. Asessment: Patient is a 41 y.o. male with:    Diagnosis Orders   1. Contusion of left great toe without damage to nail, subsequent encounter     2.  Blister of left great toe, initial encounter         Plan: Patient examined and evaluated. Current condition and treatment options discussed in detail. X rays reviewed with the pt in detail. All questions answered. nail stable in appearance. .  Pt educated that a nail procedure may be needed down the road if nail plate would loosen of any local signs of infx. Terese Deleon  Contact office with any questions/problems/concerns.   RTC in PRN

## 2019-06-12 ENCOUNTER — HOSPITAL ENCOUNTER (OUTPATIENT)
Dept: GENERAL RADIOLOGY | Age: 44
Discharge: HOME | End: 2019-06-14
Payer: COMMERCIAL

## 2019-06-12 ENCOUNTER — APPOINTMENT (OUTPATIENT)
Dept: GENERAL RADIOLOGY | Age: 44
End: 2019-06-12
Payer: COMMERCIAL

## 2019-06-12 ENCOUNTER — OFFICE VISIT (OUTPATIENT)
Dept: PRIMARY CARE CLINIC | Age: 44
End: 2019-06-12
Payer: COMMERCIAL

## 2019-06-12 VITALS
HEART RATE: 80 BPM | TEMPERATURE: 96.8 F | SYSTOLIC BLOOD PRESSURE: 128 MMHG | OXYGEN SATURATION: 98 % | DIASTOLIC BLOOD PRESSURE: 66 MMHG | BODY MASS INDEX: 40.5 KG/M2 | WEIGHT: 299 LBS | HEIGHT: 72 IN

## 2019-06-12 DIAGNOSIS — M25.552 LEFT HIP PAIN: ICD-10-CM

## 2019-06-12 DIAGNOSIS — M25.552 LEFT HIP PAIN: Primary | ICD-10-CM

## 2019-06-12 PROCEDURE — 99214 OFFICE O/P EST MOD 30 MIN: CPT | Performed by: PHYSICIAN ASSISTANT

## 2019-06-12 PROCEDURE — 96372 THER/PROPH/DIAG INJ SC/IM: CPT | Performed by: PHYSICIAN ASSISTANT

## 2019-06-12 PROCEDURE — 73502 X-RAY EXAM HIP UNI 2-3 VIEWS: CPT

## 2019-06-12 RX ORDER — BETAMETHASONE SODIUM PHOSPHATE AND BETAMETHASONE ACETATE 3; 3 MG/ML; MG/ML
12 INJECTION, SUSPENSION INTRA-ARTICULAR; INTRALESIONAL; INTRAMUSCULAR; SOFT TISSUE ONCE
Status: COMPLETED | OUTPATIENT
Start: 2019-06-12 | End: 2019-06-12

## 2019-06-12 RX ADMIN — BETAMETHASONE SODIUM PHOSPHATE AND BETAMETHASONE ACETATE 12 MG: 3; 3 INJECTION, SUSPENSION INTRA-ARTICULAR; INTRALESIONAL; INTRAMUSCULAR; SOFT TISSUE at 11:38

## 2019-06-12 ASSESSMENT — ENCOUNTER SYMPTOMS
RESPIRATORY NEGATIVE: 1
GASTROINTESTINAL NEGATIVE: 1

## 2019-06-12 NOTE — PROGRESS NOTES
Subjective:      Patient ID: Sonia Zee is a 41 y.o. male. Hip Pain    The incident occurred more than 1 week ago (x 1.5 months). There was no injury mechanism. The pain is present in the left hip and left knee. Associated symptoms include a loss of motion, numbness (chronic) and tingling (chronic). He reports no foreign bodies present. The symptoms are aggravated by movement and weight bearing. Treatments tried: Percocet, Lyrica (pain management [place], [place]) The treatment provided no relief. Review of Systems   HENT: Negative. Respiratory: Negative. Cardiovascular: Negative. Gastrointestinal: Negative. Musculoskeletal: Positive for arthralgias and gait problem. Neurological: Positive for tingling (chronic) and numbness (chronic). Past Medical History:   Diagnosis Date    Abnormal MRI of the head 02/2015    Cherise Yepez, rosemary eval incidental finding refered to [facility] rec 1 yr f/u MR    ED (erectile dysfunction)     Esophageal ulcer     EGD, Dr. Faviola Madsen, with esophageal ulcer and some inflammation on biopsy, 05/11.  Fatty liver     incidental finding on CT 2/18    GERD (gastroesophageal reflux disease)     History of stress test     11/16  10.1 mets negative images    Hyperlipidemia     Hypertension     Previously treated with Norvasc.  Hypogonadism in male 11/14/2016    Dr Cherise Yepez felt Secondary - see endo consult notes in media    Hypothyroidism     previous h/o incresed TSH, then nl off meds    IFG (impaired fasting glucose)     Lung granuloma (HCC)     1 cm calcified granuloma R perihilar,  CT lung OK 2/18    Migraine headache     remote history of    Obesity     Post laminectomy syndrome     Following surgery [facility]  09/12. Epidurals were also done March 2012 and July 2012 by Dr. Manuel Knowles.   MRI 11/16    Tobacco abuse      Past Surgical History:   Procedure Laterality Date    COLONOSCOPY  05/19/11    LUMBAR SPINE SURGERY  09/12    Lumbosacral spine procedure,  Breann Christian. [address]    Procedure for hypospadias.  OTHER SURGICAL HISTORY  03/22/04    Ureterocutaneous fistula repair.  OTHER SURGICAL HISTORY  08/26/05    Bimalleolar fracture repair with open reduction and internal fixation.  OTHER SURGICAL HISTORY  07/17/12, 3/20/12    Interlaminar epidural steroid injection.  OTHER SURGICAL HISTORY Bilateral 7/17/15, 2/20/15, 11/2014, 8/1/14, 4/24/14    L5 TFE    OTHER SURGICAL HISTORY Bilateral 1/08/16    L5 TFE    OTHER SURGICAL HISTORY Bilateral 07/01/2016    bilateral TFE    UMBILICAL HERNIA REPAIR  02/18/2016    with mesh     UPPER GASTROINTESTINAL ENDOSCOPY  05/19/11     Social History     Tobacco Use    Smoking status: Current Every Day Smoker     Packs/day: 0.50     Years: 5.00     Pack years: 2.50     Types: Cigarettes    Smokeless tobacco: Never Used   Substance Use Topics    Alcohol use: Yes     Alcohol/week: 0.0 oz     Comment: Denies current use, 8/15/17. occasionally    Drug use: No       Objective:   Physical Exam   Constitutional: He is oriented to person, place, and time. He appears well-developed. HENT:   Head: Normocephalic. Eyes: Pupils are equal, round, and reactive to light. Cardiovascular: Normal rate and regular rhythm. Pulmonary/Chest: Effort normal.   Musculoskeletal:        Left hip: He exhibits decreased range of motion and tenderness. Neurological: He is alert and oriented to person, place, and time. Skin: Skin is warm and dry. Xr Foot Left (min 3 Views)    Result Date: 5/14/2019  EXAMINATION: 3 XRAY VIEWS OF THE LEFT FOOT 5/14/2019 10:01 am COMPARISON: None.  HISTORY: ORDERING SYSTEM PROVIDED HISTORY: Pain of left great toe TECHNOLOGIST PROVIDED HISTORY: left great toe injury Ordering Physician Provided Reason for Exam: Pt smashed left 1st digit last night at work; pain Acuity: Acute Type of Exam: Initial 73-year-old male with acute left great toe pain after smashed 1st digit last night at

## 2019-06-12 NOTE — LETTER
[facility]  [address]  Phone: [phone]  Fax: [phone]    Carlene Abdi        June 12, 2019     Patient: Gabriel Srivastava   YOB: 1975   Date of Visit: 6/12/2019       To Whom It May Concern: It is my medical opinion that Gabriel Srivastava may return to light duty immediately with the following restrictions: lifting/carrying not to exceed 15-20 lbs. , pushing/pulling not to exceed 25 lbs., no stairs, no ramp, avoid excessive walking or standing  These restrictions remain in place for two weeks. If you have any questions or concerns, please don't hesitate to call.     Sincerely,        CRISTHIAN Abdi

## 2019-11-26 ENCOUNTER — OFFICE VISIT (OUTPATIENT)
Dept: INTERNAL MEDICINE | Age: 44
End: 2019-11-26
Payer: COMMERCIAL

## 2019-11-26 ENCOUNTER — HOSPITAL ENCOUNTER (OUTPATIENT)
Dept: GENERAL RADIOLOGY | Age: 44
Discharge: HOME | End: 2019-11-28
Payer: COMMERCIAL

## 2019-11-26 VITALS
SYSTOLIC BLOOD PRESSURE: 138 MMHG | BODY MASS INDEX: 41.17 KG/M2 | DIASTOLIC BLOOD PRESSURE: 88 MMHG | WEIGHT: 304 LBS | HEIGHT: 72 IN | RESPIRATION RATE: 16 BRPM | HEART RATE: 112 BPM

## 2019-11-26 DIAGNOSIS — F41.9 CHRONIC ANXIETY: ICD-10-CM

## 2019-11-26 DIAGNOSIS — I10 ESSENTIAL HYPERTENSION: Primary | ICD-10-CM

## 2019-11-26 DIAGNOSIS — D64.9 MILD ANEMIA: ICD-10-CM

## 2019-11-26 DIAGNOSIS — M25.562 CHRONIC PAIN OF LEFT KNEE: ICD-10-CM

## 2019-11-26 DIAGNOSIS — M79.652 LEFT THIGH PAIN: ICD-10-CM

## 2019-11-26 DIAGNOSIS — E78.2 MIXED HYPERLIPIDEMIA: ICD-10-CM

## 2019-11-26 DIAGNOSIS — E03.9 HYPOTHYROIDISM, UNSPECIFIED TYPE: ICD-10-CM

## 2019-11-26 DIAGNOSIS — G89.29 CHRONIC PAIN OF LEFT KNEE: ICD-10-CM

## 2019-11-26 DIAGNOSIS — K21.9 GASTROESOPHAGEAL REFLUX DISEASE WITHOUT ESOPHAGITIS: ICD-10-CM

## 2019-11-26 DIAGNOSIS — R73.01 IFG (IMPAIRED FASTING GLUCOSE): ICD-10-CM

## 2019-11-26 PROCEDURE — 99214 OFFICE O/P EST MOD 30 MIN: CPT | Performed by: INTERNAL MEDICINE

## 2019-11-26 PROCEDURE — 73562 X-RAY EXAM OF KNEE 3: CPT

## 2019-11-26 RX ORDER — ALPRAZOLAM 0.5 MG/1
0.5 TABLET ORAL 3 TIMES DAILY PRN
Qty: 90 TABLET | Refills: 2 | Status: SHIPPED | OUTPATIENT
Start: 2019-11-26 | End: 2020-02-24

## 2019-11-26 RX ORDER — LISINOPRIL AND HYDROCHLOROTHIAZIDE 12.5; 1 MG/1; MG/1
2 TABLET ORAL DAILY
COMMUNITY
End: 2020-12-23 | Stop reason: SDUPTHER

## 2019-11-26 ASSESSMENT — ENCOUNTER SYMPTOMS
HEARTBURN: 1
COUGH: 0
ABDOMINAL PAIN: 0
VOMITING: 0
BACK PAIN: 0
BLOOD IN STOOL: 0
CONSTIPATION: 0
EYE PAIN: 0
DIARRHEA: 0
SHORTNESS OF BREATH: 0
NAUSEA: 0

## 2019-12-03 ENCOUNTER — OFFICE VISIT (OUTPATIENT)
Dept: ORTHOPEDIC SURGERY | Age: 44
End: 2019-12-03
Payer: COMMERCIAL

## 2019-12-03 VITALS
DIASTOLIC BLOOD PRESSURE: 78 MMHG | WEIGHT: 304 LBS | HEIGHT: 72 IN | BODY MASS INDEX: 41.17 KG/M2 | HEART RATE: 109 BPM | SYSTOLIC BLOOD PRESSURE: 134 MMHG

## 2019-12-03 DIAGNOSIS — M17.12 PRIMARY OSTEOARTHRITIS OF LEFT KNEE: Primary | ICD-10-CM

## 2019-12-03 DIAGNOSIS — M23.92 KNEE INTERNAL DERANGEMENT, LEFT: ICD-10-CM

## 2019-12-03 DIAGNOSIS — M25.562 LEFT KNEE PAIN, UNSPECIFIED CHRONICITY: ICD-10-CM

## 2019-12-03 PROCEDURE — 20610 DRAIN/INJ JOINT/BURSA W/O US: CPT | Performed by: NURSE PRACTITIONER

## 2019-12-03 PROCEDURE — 99203 OFFICE O/P NEW LOW 30 MIN: CPT | Performed by: NURSE PRACTITIONER

## 2019-12-03 RX ORDER — BUPIVACAINE HYDROCHLORIDE 5 MG/ML
5 INJECTION, SOLUTION PERINEURAL ONCE
Status: COMPLETED | OUTPATIENT
Start: 2019-12-03 | End: 2019-12-03

## 2019-12-03 RX ORDER — GABAPENTIN 100 MG/1
100 CAPSULE ORAL DAILY
COMMUNITY
End: 2020-06-23

## 2019-12-03 RX ORDER — TRIAMCINOLONE ACETONIDE 40 MG/ML
80 INJECTION, SUSPENSION INTRA-ARTICULAR; INTRAMUSCULAR ONCE
Status: COMPLETED | OUTPATIENT
Start: 2019-12-03 | End: 2019-12-03

## 2019-12-03 RX ADMIN — TRIAMCINOLONE ACETONIDE 80 MG: 40 INJECTION, SUSPENSION INTRA-ARTICULAR; INTRAMUSCULAR at 13:45

## 2019-12-03 RX ADMIN — BUPIVACAINE HYDROCHLORIDE 25 MG: 5 INJECTION, SOLUTION PERINEURAL at 13:46

## 2019-12-13 ENCOUNTER — HOSPITAL ENCOUNTER (OUTPATIENT)
Dept: MRI IMAGING | Age: 44
Discharge: HOME | End: 2019-12-15
Payer: COMMERCIAL

## 2019-12-13 ENCOUNTER — HOSPITAL ENCOUNTER (OUTPATIENT)
Dept: LAB | Age: 44
Discharge: HOME | End: 2019-12-13
Payer: COMMERCIAL

## 2019-12-13 DIAGNOSIS — E78.2 MIXED HYPERLIPIDEMIA: ICD-10-CM

## 2019-12-13 DIAGNOSIS — D64.9 MILD ANEMIA: ICD-10-CM

## 2019-12-13 DIAGNOSIS — E78.00 PURE HYPERCHOLESTEROLEMIA: ICD-10-CM

## 2019-12-13 DIAGNOSIS — M23.92 KNEE INTERNAL DERANGEMENT, LEFT: ICD-10-CM

## 2019-12-13 DIAGNOSIS — R73.01 IFG (IMPAIRED FASTING GLUCOSE): ICD-10-CM

## 2019-12-13 DIAGNOSIS — D64.9 ANEMIA, UNSPECIFIED TYPE: ICD-10-CM

## 2019-12-13 DIAGNOSIS — D64.9 ANEMIA, UNSPECIFIED TYPE: Primary | ICD-10-CM

## 2019-12-13 DIAGNOSIS — I10 ESSENTIAL HYPERTENSION: ICD-10-CM

## 2019-12-13 LAB
ABSOLUTE EOS #: 0.1 K/UL (ref 0–0.4)
ABSOLUTE IMMATURE GRANULOCYTE: ABNORMAL K/UL (ref 0–0.3)
ABSOLUTE LYMPH #: 3.5 K/UL (ref 1–4.8)
ABSOLUTE MONO #: 0.7 K/UL (ref 0.1–1.2)
ALBUMIN SERPL-MCNC: 4.5 G/DL (ref 3.5–5.2)
ALBUMIN/GLOBULIN RATIO: 1.4 (ref 1–2.5)
ALP BLD-CCNC: 76 U/L (ref 40–129)
ALT SERPL-CCNC: 22 U/L (ref 5–41)
ANION GAP SERPL CALCULATED.3IONS-SCNC: 11 MMOL/L (ref 9–17)
AST SERPL-CCNC: 19 U/L
BASOPHILS # BLD: 0 % (ref 0–2)
BASOPHILS ABSOLUTE: 0 K/UL (ref 0–0.2)
BILIRUB SERPL-MCNC: 0.26 MG/DL (ref 0.3–1.2)
BUN BLDV-MCNC: 18 MG/DL (ref 6–20)
BUN/CREAT BLD: 28 (ref 9–20)
CALCIUM SERPL-MCNC: 9.6 MG/DL (ref 8.6–10.4)
CHLORIDE BLD-SCNC: 98 MMOL/L (ref 98–107)
CHOLESTEROL/HDL RATIO: 4.2
CHOLESTEROL: 212 MG/DL
CO2: 31 MMOL/L (ref 20–31)
CREAT SERPL-MCNC: 0.64 MG/DL (ref 0.7–1.2)
DIFFERENTIAL TYPE: ABNORMAL
EOSINOPHILS RELATIVE PERCENT: 1 % (ref 1–8)
GFR AFRICAN AMERICAN: >60 ML/MIN
GFR NON-AFRICAN AMERICAN: >60 ML/MIN
GFR SERPL CREATININE-BSD FRML MDRD: ABNORMAL ML/MIN/{1.73_M2}
GFR SERPL CREATININE-BSD FRML MDRD: ABNORMAL ML/MIN/{1.73_M2}
GLUCOSE BLD-MCNC: 94 MG/DL (ref 70–99)
HCT VFR BLD CALC: 37.5 % (ref 41–53)
HDLC SERPL-MCNC: 50 MG/DL
HEMOGLOBIN: 12.6 G/DL (ref 13.5–17.5)
IMMATURE GRANULOCYTES: ABNORMAL %
LDL CHOLESTEROL: 134 MG/DL (ref 0–130)
LYMPHOCYTES # BLD: 41 % (ref 15–43)
MCH RBC QN AUTO: 32 PG (ref 26–34)
MCHC RBC AUTO-ENTMCNC: 33.4 G/DL (ref 31–37)
MCV RBC AUTO: 95.6 FL (ref 80–100)
MONOCYTES # BLD: 8 % (ref 6–14)
NRBC AUTOMATED: ABNORMAL PER 100 WBC
PDW BLD-RTO: 13.4 % (ref 11–14.5)
PLATELET # BLD: 259 K/UL (ref 140–450)
PLATELET ESTIMATE: ABNORMAL
PMV BLD AUTO: 8.8 FL (ref 6–12)
POTASSIUM SERPL-SCNC: 3.8 MMOL/L (ref 3.7–5.3)
RBC # BLD: 3.92 M/UL (ref 4.5–5.9)
RBC # BLD: ABNORMAL 10*6/UL
SEG NEUTROPHILS: 50 % (ref 44–74)
SEGMENTED NEUTROPHILS ABSOLUTE COUNT: 4.3 K/UL (ref 1.8–7.7)
SODIUM BLD-SCNC: 140 MMOL/L (ref 135–144)
TOTAL CK: 214 U/L (ref 39–308)
TOTAL PROTEIN: 7.7 G/DL (ref 6.4–8.3)
TRIGL SERPL-MCNC: 142 MG/DL
VLDLC SERPL CALC-MCNC: ABNORMAL MG/DL (ref 1–30)
WBC # BLD: 8.6 K/UL (ref 3.5–11)
WBC # BLD: ABNORMAL 10*3/UL

## 2019-12-13 PROCEDURE — 85025 COMPLETE CBC W/AUTO DIFF WBC: CPT

## 2019-12-13 PROCEDURE — 80053 COMPREHEN METABOLIC PANEL: CPT

## 2019-12-13 PROCEDURE — 36415 COLL VENOUS BLD VENIPUNCTURE: CPT

## 2019-12-13 PROCEDURE — 80061 LIPID PANEL: CPT

## 2019-12-13 PROCEDURE — 73721 MRI JNT OF LWR EXTRE W/O DYE: CPT

## 2019-12-13 PROCEDURE — 82550 ASSAY OF CK (CPK): CPT

## 2019-12-17 ENCOUNTER — TELEPHONE (OUTPATIENT)
Dept: ORTHOPEDIC SURGERY | Age: 44
End: 2019-12-17

## 2019-12-23 ENCOUNTER — OFFICE VISIT (OUTPATIENT)
Dept: INTERNAL MEDICINE | Age: 44
End: 2019-12-23
Payer: COMMERCIAL

## 2019-12-23 VITALS
DIASTOLIC BLOOD PRESSURE: 88 MMHG | SYSTOLIC BLOOD PRESSURE: 130 MMHG | HEIGHT: 72 IN | WEIGHT: 296 LBS | HEART RATE: 88 BPM | RESPIRATION RATE: 16 BRPM | BODY MASS INDEX: 40.09 KG/M2

## 2019-12-23 DIAGNOSIS — I10 ESSENTIAL HYPERTENSION: ICD-10-CM

## 2019-12-23 DIAGNOSIS — F41.9 ANXIETY: ICD-10-CM

## 2019-12-23 DIAGNOSIS — E78.2 MIXED HYPERLIPIDEMIA: ICD-10-CM

## 2019-12-23 DIAGNOSIS — Z00.00 GENERAL MEDICAL EXAM: Primary | ICD-10-CM

## 2019-12-23 PROCEDURE — 99396 PREV VISIT EST AGE 40-64: CPT | Performed by: INTERNAL MEDICINE

## 2019-12-23 RX ORDER — SERTRALINE HYDROCHLORIDE 100 MG/1
100 TABLET, FILM COATED ORAL DAILY
Qty: 90 TABLET | Refills: 3 | Status: SHIPPED | OUTPATIENT
Start: 2019-12-23 | End: 2020-06-23 | Stop reason: SDUPTHER

## 2019-12-23 RX ORDER — ATORVASTATIN CALCIUM 10 MG/1
10 TABLET, FILM COATED ORAL DAILY
Qty: 90 TABLET | Refills: 3 | Status: SHIPPED | OUTPATIENT
Start: 2019-12-23 | End: 2020-06-23 | Stop reason: SDUPTHER

## 2019-12-23 ASSESSMENT — PATIENT HEALTH QUESTIONNAIRE - PHQ9
2. FEELING DOWN, DEPRESSED OR HOPELESS: 0
SUM OF ALL RESPONSES TO PHQ9 QUESTIONS 1 & 2: 0
SUM OF ALL RESPONSES TO PHQ QUESTIONS 1-9: 0
SUM OF ALL RESPONSES TO PHQ QUESTIONS 1-9: 0
1. LITTLE INTEREST OR PLEASURE IN DOING THINGS: 0

## 2019-12-23 ASSESSMENT — ENCOUNTER SYMPTOMS
EYE PAIN: 0
NAUSEA: 0
CONSTIPATION: 0
ABDOMINAL PAIN: 0
COUGH: 0
VOMITING: 0
BLOOD IN STOOL: 0
SHORTNESS OF BREATH: 0
BACK PAIN: 0
DIARRHEA: 0

## 2020-02-04 ENCOUNTER — OFFICE VISIT (OUTPATIENT)
Dept: ORTHOPEDIC SURGERY | Age: 45
End: 2020-02-04
Payer: COMMERCIAL

## 2020-02-04 VITALS
SYSTOLIC BLOOD PRESSURE: 118 MMHG | BODY MASS INDEX: 41.17 KG/M2 | HEIGHT: 72 IN | DIASTOLIC BLOOD PRESSURE: 76 MMHG | WEIGHT: 304 LBS | HEART RATE: 102 BPM

## 2020-02-04 PROBLEM — M25.852 HIP IMPINGEMENT SYNDROME, LEFT: Status: ACTIVE | Noted: 2020-02-04

## 2020-02-04 PROCEDURE — 99213 OFFICE O/P EST LOW 20 MIN: CPT | Performed by: PHYSICIAN ASSISTANT

## 2020-02-04 NOTE — PROGRESS NOTES
MG capsule Take 100 mg by mouth daily. Indications: per patient      lisinopril-hydrochlorothiazide (PRINZIDE;ZESTORETIC) 10-12.5 MG per tablet Take 2 tablets by mouth daily      metoprolol tartrate (LOPRESSOR) 25 MG tablet Take 1 tablet by mouth 2 times daily 60 tablet 5    ALPRAZolam (XANAX) 0.5 MG tablet Take 1 tablet by mouth 3 times daily as needed for Sleep or Anxiety for up to 90 days. 90 tablet 2    tiZANidine (ZANAFLEX) 4 MG tablet Take 1 tablet by mouth 3 times daily 20 tablet 0    tadalafil (CIALIS) 20 MG tablet Take 1 tablet by mouth as needed for Erectile Dysfunction 36 tablet 3    oxyCODONE-acetaminophen (PERCOCET) 7.5-325 MG per tablet Take 1 tablet by mouth 4 times daily as needed. 0    pregabalin (LYRICA) 150 MG capsule Take 1 capsule by mouth 3 times daily 270 capsule 1    Tens Unit MISC by Does not apply route. Pt needs eval for TENS unit replacement. The one he had ( with the back brace) was stolen recently. 1 each 0     Current Facility-Administered Medications   Medication Dose Route Frequency Provider Last Rate Last Dose    testosterone cypionate (DEPOTESTOTERONE CYPIONATE) injection 200 mg  200 mg Intramuscular Q21 Days Carlos Pack MD   200 mg at 11/18/14 0108         DIAGNOSTICS   X-rays personally reviewed by me of prior x-rays AP pelvis and left hip do reveal femoral head changes. Some flattening of the superior dome. As well as osteophytes within the inferior dome. Concern for impingement syndrome and underlying osteoarthritis    IMPRESSION   Impingement syndrome, OA left hip    PLAN    At this time feel this patient benefit from undergoing intra-articular corticosteroid injection within the left hip risk and benefits discussed with patient patient like to proceed at this time.   Once is completed discuss further treatment options if he does get relief from this    Electronically signed by Alona Knowles PA-C on 2/4/2020 at 8:12 AM

## 2020-02-14 ENCOUNTER — HOSPITAL ENCOUNTER (OUTPATIENT)
Dept: GENERAL RADIOLOGY | Age: 45
Discharge: HOME | End: 2020-02-16
Payer: COMMERCIAL

## 2020-02-14 PROCEDURE — 6360000004 HC RX CONTRAST MEDICATION: Performed by: ORTHOPAEDIC SURGERY

## 2020-02-14 PROCEDURE — 77002 NEEDLE LOCALIZATION BY XRAY: CPT

## 2020-02-14 PROCEDURE — 20610 DRAIN/INJ JOINT/BURSA W/O US: CPT

## 2020-02-14 PROCEDURE — 6360000002 HC RX W HCPCS

## 2020-02-14 PROCEDURE — 2500000003 HC RX 250 WO HCPCS

## 2020-02-14 RX ADMIN — IOHEXOL 2 ML: 240 INJECTION, SOLUTION INTRATHECAL; INTRAVASCULAR; INTRAVENOUS; ORAL at 09:10

## 2020-04-01 RX ORDER — TADALAFIL 20 MG/1
20 TABLET ORAL PRN
Qty: 36 TABLET | Refills: 3 | Status: SHIPPED | OUTPATIENT
Start: 2020-04-01 | End: 2020-07-20 | Stop reason: SDUPTHER

## 2020-06-23 ENCOUNTER — HOSPITAL ENCOUNTER (OUTPATIENT)
Dept: LAB | Age: 45
Discharge: HOME | End: 2020-06-23
Payer: COMMERCIAL

## 2020-06-23 ENCOUNTER — OFFICE VISIT (OUTPATIENT)
Dept: INTERNAL MEDICINE | Age: 45
End: 2020-06-23
Payer: COMMERCIAL

## 2020-06-23 VITALS
HEIGHT: 72 IN | RESPIRATION RATE: 16 BRPM | WEIGHT: 298 LBS | SYSTOLIC BLOOD PRESSURE: 110 MMHG | DIASTOLIC BLOOD PRESSURE: 82 MMHG | BODY MASS INDEX: 40.36 KG/M2 | HEART RATE: 100 BPM

## 2020-06-23 LAB
ESTIMATED AVERAGE GLUCOSE: 131 MG/DL
FOLATE: 14.8 NG/ML
HBA1C MFR BLD: 6.2 % (ref 4.8–5.9)
HEMOGLOBIN: 14.1 G/DL (ref 13–17)
IRON SATURATION: 22 % (ref 20–55)
IRON: 82 UG/DL (ref 59–158)
THYROXINE, FREE: 1.09 NG/DL (ref 0.93–1.7)
TOTAL IRON BINDING CAPACITY: 381 UG/DL (ref 250–450)
TSH SERPL DL<=0.05 MIU/L-ACNC: 1.49 MIU/L (ref 0.3–5)
UNSATURATED IRON BINDING CAPACITY: 299 UG/DL (ref 112–347)
VITAMIN B-12: 412 PG/ML (ref 232–1245)

## 2020-06-23 PROCEDURE — 83036 HEMOGLOBIN GLYCOSYLATED A1C: CPT

## 2020-06-23 PROCEDURE — 85018 HEMOGLOBIN: CPT

## 2020-06-23 PROCEDURE — 83550 IRON BINDING TEST: CPT

## 2020-06-23 PROCEDURE — 82746 ASSAY OF FOLIC ACID SERUM: CPT

## 2020-06-23 PROCEDURE — 82607 VITAMIN B-12: CPT

## 2020-06-23 PROCEDURE — 99214 OFFICE O/P EST MOD 30 MIN: CPT | Performed by: INTERNAL MEDICINE

## 2020-06-23 PROCEDURE — 36415 COLL VENOUS BLD VENIPUNCTURE: CPT

## 2020-06-23 PROCEDURE — 84443 ASSAY THYROID STIM HORMONE: CPT

## 2020-06-23 PROCEDURE — 83540 ASSAY OF IRON: CPT

## 2020-06-23 PROCEDURE — 84439 ASSAY OF FREE THYROXINE: CPT

## 2020-06-23 RX ORDER — SERTRALINE HYDROCHLORIDE 100 MG/1
100 TABLET, FILM COATED ORAL DAILY
Qty: 90 TABLET | Refills: 3 | Status: SHIPPED | OUTPATIENT
Start: 2020-06-23 | End: 2022-06-20

## 2020-06-23 RX ORDER — ATORVASTATIN CALCIUM 10 MG/1
10 TABLET, FILM COATED ORAL DAILY
Qty: 90 TABLET | Refills: 3 | Status: SHIPPED | OUTPATIENT
Start: 2020-06-23

## 2020-06-23 ASSESSMENT — PATIENT HEALTH QUESTIONNAIRE - PHQ9
SUM OF ALL RESPONSES TO PHQ QUESTIONS 1-9: 0
2. FEELING DOWN, DEPRESSED OR HOPELESS: 0
1. LITTLE INTEREST OR PLEASURE IN DOING THINGS: 0
SUM OF ALL RESPONSES TO PHQ QUESTIONS 1-9: 0
SUM OF ALL RESPONSES TO PHQ9 QUESTIONS 1 & 2: 0

## 2020-06-23 ASSESSMENT — ENCOUNTER SYMPTOMS
COUGH: 0
SHORTNESS OF BREATH: 0
HEARTBURN: 1
NAUSEA: 0
ABDOMINAL PAIN: 0
EYE PAIN: 0
DIARRHEA: 0
BLOOD IN STOOL: 0
CONSTIPATION: 0
VOMITING: 0
BACK PAIN: 0

## 2020-06-23 NOTE — PROGRESS NOTES
Smokeless tobacco: Never Used   Substance Use Topics    Alcohol use: Yes     Alcohol/week: 0.0 standard drinks     Comment: Denies current use, 8/15/17. occasionally         Current Outpatient Medications   Medication Sig Dispense Refill    atorvastatin (LIPITOR) 10 MG tablet Take 1 tablet by mouth daily 90 tablet 3    sertraline (ZOLOFT) 100 MG tablet Take 1 tablet by mouth daily 90 tablet 3    tadalafil (CIALIS) 20 MG tablet Take 1 tablet by mouth as needed for Erectile Dysfunction 36 tablet 3    lisinopril-hydrochlorothiazide (PRINZIDE;ZESTORETIC) 10-12.5 MG per tablet Take 2 tablets by mouth daily      tiZANidine (ZANAFLEX) 4 MG tablet Take 1 tablet by mouth 3 times daily 20 tablet 0    oxyCODONE-acetaminophen (PERCOCET) 7.5-325 MG per tablet Take 1 tablet by mouth 4 times daily as needed. 0    Tens Unit MISC by Does not apply route. Pt needs eval for TENS unit replacement. The one he had ( with the back brace) was stolen recently. 1 each 0     Current Facility-Administered Medications   Medication Dose Route Frequency Provider Last Rate Last Dose    testosterone cypionate (DEPOTESTOTERONE CYPIONATE) injection 200 mg  200 mg Intramuscular Q21 Days Jessica Olmos MD   200 mg at 11/18/14 2918     No Known Allergies    Health Maintenance   Topic Date Due    A1C test (Diabetic or Prediabetic)  03/19/2020    TSH testing  03/19/2020    Flu vaccine (Season Ended) 11/26/2020 (Originally 9/1/2020)    Pneumococcal 0-64 years Vaccine (1 of 1 - PPSV23) 11/26/2020 (Originally 10/11/1983)    Lipid screen  12/13/2020    Potassium monitoring  12/13/2020    Creatinine monitoring  12/13/2020    DTaP/Tdap/Td vaccine (2 - Td) 10/07/2026    HIV screen  Completed    Hepatitis A vaccine  Aged Out    Hepatitis B vaccine  Aged Out    Hib vaccine  Aged Out    Meningococcal (ACWY) vaccine  Aged Out       Subjective:      Review of Systems   Constitutional: Negative for chills and fever.    HENT: Negative for Arm, Position: Sitting, Cuff Size: Large Adult)   Pulse 100   Resp 16   Ht 6' (1.829 m)   Wt 298 lb (135.2 kg)   BMI 40.42 kg/m²     Assessment:       Diagnosis Orders   1. Essential hypertension  Comprehensive Metabolic Panel    atorvastatin (LIPITOR) 10 MG tablet   2. Gastroesophageal reflux disease without esophagitis     3. Other hyperlipidemia  Lipid Panel   4. IFG (impaired fasting glucose)  Hemoglobin A1C   5. Other iron deficiency anemia  CBC Auto Differential   6. Chronic anxiety  sertraline (ZOLOFT) 100 MG tablet   7. Chronic pain of left knee  External Referral To Orthopedic Surgery             Plan:       Return in about 6 months (around 12/28/2020) for Annual Physical, Hyperlipidemia, Hypertension. Orders Placed This Encounter   Procedures    CBC Auto Differential     Standing Status:   Future     Standing Expiration Date:   6/23/2021    Comprehensive Metabolic Panel     Standing Status:   Future     Standing Expiration Date:   6/23/2021    Lipid Panel     Standing Status:   Future     Standing Expiration Date:   6/23/2021     Order Specific Question:   Is Patient Fasting?/# of Hours     Answer:   yes    Hemoglobin A1C     Standing Status:   Future     Standing Expiration Date:   6/23/2021    External Referral To Orthopedic Surgery     Referral Priority:   Routine     Referral Type:   Eval and Treat     Referral Reason:   Specialty Services Required     Requested Specialty:   Orthopedic Surgery     Number of Visits Requested:   1     Orders Placed This Encounter   Medications    atorvastatin (LIPITOR) 10 MG tablet     Sig: Take 1 tablet by mouth daily     Dispense:  90 tablet     Refill:  3    sertraline (ZOLOFT) 100 MG tablet     Sig: Take 1 tablet by mouth daily     Dispense:  90 tablet     Refill:  3       Patientgiven educational materials - see patient instructions. Discussed use, benefit,and side effects of prescribed medications. All patient questions answered.  Ptvoiced

## 2020-07-18 ENCOUNTER — HOSPITAL ENCOUNTER (OUTPATIENT)
Age: 45
Setting detail: SPECIMEN
Discharge: HOME | End: 2020-07-18
Payer: COMMERCIAL

## 2020-07-18 ENCOUNTER — OFFICE VISIT (OUTPATIENT)
Dept: PRIMARY CARE CLINIC | Age: 45
End: 2020-07-18
Payer: COMMERCIAL

## 2020-07-18 VITALS
BODY MASS INDEX: 40.34 KG/M2 | RESPIRATION RATE: 18 BRPM | SYSTOLIC BLOOD PRESSURE: 138 MMHG | HEIGHT: 72 IN | WEIGHT: 297.8 LBS | TEMPERATURE: 98.7 F | DIASTOLIC BLOOD PRESSURE: 98 MMHG | HEART RATE: 111 BPM | OXYGEN SATURATION: 98 %

## 2020-07-18 PROCEDURE — 99213 OFFICE O/P EST LOW 20 MIN: CPT | Performed by: NURSE PRACTITIONER

## 2020-07-18 PROCEDURE — U0003 INFECTIOUS AGENT DETECTION BY NUCLEIC ACID (DNA OR RNA); SEVERE ACUTE RESPIRATORY SYNDROME CORONAVIRUS 2 (SARS-COV-2) (CORONAVIRUS DISEASE [COVID-19]), AMPLIFIED PROBE TECHNIQUE, MAKING USE OF HIGH THROUGHPUT TECHNOLOGIES AS DESCRIBED BY CMS-2020-01-R: HCPCS

## 2020-07-18 ASSESSMENT — ENCOUNTER SYMPTOMS
COUGH: 1
WHEEZING: 0
SHORTNESS OF BREATH: 0
ABDOMINAL PAIN: 0
SINUS PRESSURE: 0
SORE THROAT: 0
RHINORRHEA: 1
DIARRHEA: 1
BLOATING: 0
NAUSEA: 1
VOMITING: 0

## 2020-07-18 ASSESSMENT — PATIENT HEALTH QUESTIONNAIRE - PHQ9
1. LITTLE INTEREST OR PLEASURE IN DOING THINGS: 0
SUM OF ALL RESPONSES TO PHQ QUESTIONS 1-9: 0
SUM OF ALL RESPONSES TO PHQ QUESTIONS 1-9: 0

## 2020-07-18 NOTE — PROGRESS NOTES
[facility]             [address], [address]                        Telephone [phone]             Fax [phone]     Trever Mitchell  1975  FEB:[id number]   Date of visit:  7/18/2020    Subjective:    Trever Mitchell is a 45 y.o.  male who presents to [facility] today (7/18/2020) for evaluation of:    Chief Complaint   Patient presents with    Fever     headache,body aches,diarrhea,chills,started friday       Fever    This is a new problem. The current episode started yesterday. The problem occurs intermittently. The problem has been unchanged. The maximum temperature noted was 101 to 101.9 F (101.7 highest yesterday). Associated symptoms include coughing (dry slight), diarrhea, muscle aches and nausea. Pertinent negatives include no abdominal pain, chest pain, congestion, headaches, rash, sore throat, vomiting or wheezing. Associated symptoms comments: Chills, sweats. Treatments tried: tylenol cold and flu, zofran. The treatment provided mild relief. Diarrhea    This is a new problem. The current episode started yesterday. The problem occurs 5 to 10 times per day. The problem has been unchanged. The stool consistency is described as watery. The patient states that diarrhea does not awaken him from sleep. Associated symptoms include chills, coughing (dry slight), a fever and myalgias. Pertinent negatives include no abdominal pain, bloating, headaches or vomiting. Nothing aggravates the symptoms. Treatments tried: imodium. The treatment provided mild relief.        He has the following problem list:  Patient Active Problem List   Diagnosis    Hyperlipidemia    Tobacco abuse    Hypertension    Hypothyroidism    ED (erectile dysfunction)    Post laminectomy syndrome    IFG (impaired fasting glucose)    Anxiety    Arthritis of ankle joint    Hypogonadism in male   [place]  Abnormal MRI of the head    Incisional hernia, without obstruction or gangrene    Obesity, morbid, BMI 40.0-49.9 (HCC)    GERD (gastroesophageal reflux disease)    Fatty liver    Hip impingement syndrome, left        Current medications are:  Current Outpatient Medications   Medication Sig Dispense Refill    atorvastatin (LIPITOR) 10 MG tablet Take 1 tablet by mouth daily 90 tablet 3    sertraline (ZOLOFT) 100 MG tablet Take 1 tablet by mouth daily 90 tablet 3    tadalafil (CIALIS) 20 MG tablet Take 1 tablet by mouth as needed for Erectile Dysfunction 36 tablet 3    lisinopril-hydrochlorothiazide (PRINZIDE;ZESTORETIC) 10-12.5 MG per tablet Take 2 tablets by mouth daily      tiZANidine (ZANAFLEX) 4 MG tablet Take 1 tablet by mouth 3 times daily 20 tablet 0    oxyCODONE-acetaminophen (PERCOCET) 7.5-325 MG per tablet Take 1 tablet by mouth 4 times daily as needed. 0    Tens Unit MISC by Does not apply route. Pt needs eval for TENS unit replacement. The one he had ( with the back brace) was stolen recently. 1 each 0     Current Facility-Administered Medications   Medication Dose Route Frequency Provider Last Rate Last Dose    testosterone cypionate (DEPOTESTOTERONE CYPIONATE) injection 200 mg  200 mg Intramuscular Q21 Days Augustine Drake MD   200 mg at 11/18/14 3761        He has No Known Allergies. [place] He  reports that he has been smoking cigarettes. He has a 2.50 pack-year smoking history. He has never used smokeless tobacco.      Objective:    Vitals:    07/18/20 1200   BP: (!) 138/98   Pulse: 111   Resp: 18   Temp: 98.7 °F (37.1 °C)   TempSrc: Tympanic   SpO2: 98%   Weight: 297 lb 12.8 oz (135.1 kg)   Height: 6' (1.829 m)     Body mass index is 40.39 kg/m². Review of Systems   Constitutional: Positive for appetite change, chills, fatigue and fever. HENT: Positive for rhinorrhea. Negative for congestion, postnasal drip, sinus pressure and sore throat.     Respiratory: Positive for cough (dry slight). Negative for shortness of breath and wheezing. Cardiovascular: Negative. Negative for chest pain. Gastrointestinal: Positive for diarrhea and nausea. Negative for abdominal pain, bloating and vomiting. Musculoskeletal: Positive for myalgias. Skin: Negative for rash. Neurological: Negative for headaches. Physical Exam  Vitals signs and nursing note reviewed. Constitutional:       Appearance: He is well-developed. HENT:      Head: Normocephalic. Jaw: There is normal jaw occlusion. Right Ear: Tympanic membrane, ear canal and external ear normal.      Left Ear: Tympanic membrane, ear canal and external ear normal.      Nose: Rhinorrhea present. Rhinorrhea is clear. Right Turbinates: Swollen (erythema). Left Turbinates: Swollen (erythema). Right Sinus: No maxillary sinus tenderness or frontal sinus tenderness. Left Sinus: No maxillary sinus tenderness or frontal sinus tenderness. Mouth/Throat:      Lips: Pink. Mouth: Mucous membranes are moist.      Pharynx: Oropharynx is clear. Uvula midline. Eyes:      Pupils: Pupils are equal, round, and reactive to light. Neck:      Musculoskeletal: Normal range of motion and neck supple. Cardiovascular:      Rate and Rhythm: Normal rate and regular rhythm. Heart sounds: Normal heart sounds. Pulmonary:      Effort: Pulmonary effort is normal.      Breath sounds: Normal breath sounds and air entry. Abdominal:      General: Bowel sounds are increased. Palpations: Abdomen is soft. Tenderness: There is no abdominal tenderness. Lymphadenopathy:      Cervical: No cervical adenopathy. Skin:     General: Skin is warm and dry. Neurological:      Mental Status: He is alert and oriented to person, place, and time. Psychiatric:         Behavior: Behavior normal.         Thought Content: Thought content normal.       Assessment and Plan:    No results found for this visit on 07/18/20. Diagnosis Orders   1. Diarrhea, unspecified type  Covid-19 Ambulatory   2. Person under investigation for COVID-19  Covid-19 Ambulatory   3. Cough  Covid-19 Ambulatory     Self quarantine until negative Covid-19 test result received and symptoms improving. We will call with Covid-19 test results. I recommended the BRAT diet. Take OTC Imodium as needed for diarrhea per package instructions. Continue Zofran as needed for nausea. Take Tylenol as needed for pain/fever. We discussed the symptoms of dehydration. Instructed to follow up with PCP if symptoms have not improved or worsen. The use, risks, benefits, and side effects of prescribed or recommended medications were discussed. All questions were answered and the patient/caregiver voiced understanding. No orders of the defined types were placed in this encounter.         Electronically signed by ASAH Oliva CNP on 7/18/20 at 12:10 PM EDT

## 2020-07-20 RX ORDER — TADALAFIL 20 MG/1
20 TABLET ORAL PRN
Qty: 36 TABLET | Refills: 3 | Status: SHIPPED | OUTPATIENT
Start: 2020-07-20 | End: 2021-07-27 | Stop reason: SDUPTHER

## 2020-07-24 ENCOUNTER — TELEPHONE (OUTPATIENT)
Dept: PRIMARY CARE CLINIC | Age: 45
End: 2020-07-24

## 2020-07-24 LAB — SARS-COV-2, NAA: NOT DETECTED

## 2020-10-23 ENCOUNTER — HOSPITAL ENCOUNTER (OUTPATIENT)
Dept: GENERAL RADIOLOGY | Age: 45
Discharge: HOME | End: 2020-10-25
Payer: COMMERCIAL

## 2020-10-23 ENCOUNTER — OFFICE VISIT (OUTPATIENT)
Dept: INTERNAL MEDICINE | Age: 45
End: 2020-10-23
Payer: COMMERCIAL

## 2020-10-23 VITALS
HEART RATE: 98 BPM | HEIGHT: 72 IN | WEIGHT: 301 LBS | SYSTOLIC BLOOD PRESSURE: 138 MMHG | BODY MASS INDEX: 40.77 KG/M2 | DIASTOLIC BLOOD PRESSURE: 88 MMHG

## 2020-10-23 PROCEDURE — 73502 X-RAY EXAM HIP UNI 2-3 VIEWS: CPT

## 2020-10-23 PROCEDURE — 99214 OFFICE O/P EST MOD 30 MIN: CPT | Performed by: INTERNAL MEDICINE

## 2020-10-23 RX ORDER — METHYLPREDNISOLONE 4 MG/1
TABLET ORAL
Qty: 1 KIT | Refills: 0 | Status: SHIPPED | OUTPATIENT
Start: 2020-10-23 | End: 2020-10-29

## 2020-10-23 ASSESSMENT — ENCOUNTER SYMPTOMS
BLOOD IN STOOL: 0
EYE PAIN: 0
DIARRHEA: 0
SHORTNESS OF BREATH: 0
VOMITING: 0
ABDOMINAL PAIN: 0
CONSTIPATION: 0
COUGH: 0
BACK PAIN: 0
NAUSEA: 0

## 2020-10-23 NOTE — LETTER
[facility] Internal Med A department of [facility]  Phone: [phone]  Fax: [phone]    Horace Blanchard MD        October 23, 2020     Patient: Claire Calvo   YOB: 1975   Date of Visit: 10/23/2020       To Whom it May Concern:    Claire Calvo was seen in my clinic on 10/23/2020. Please excuse patient from work on 10/22/2020 to present. He may return to work on November 9, 2020. If you have any questions or concerns, please don't hesitate to call.     Sincerely,         Horace Blanchard MD

## 2020-10-23 NOTE — PROGRESS NOTES
[place] [address]  Dept: [phone]  Dept Fax: [phone]  Loc: [phone]     Choco Deluca is a 39 y.o. male who presents today for his medical conditions/complaintsas noted below. Choco Deluca is c/o of   Chief Complaint   Patient presents with    Other     L groin pain, x 1 week, had swelling in the beginning but has since gone away, 2 nights ago he had a hard a hard time lifting his leg.  Hypertension    Hyperlipidemia         HPI:     Other   This is a new (1-groin pain, left) problem. The current episode started in the past 7 days. The problem occurs intermittently. The problem has been waxing and waning. Pertinent negatives include no abdominal pain, arthralgias, chest pain, chills, coughing, fever, headaches, nausea, neck pain, numbness, rash, vomiting or weakness. Hypertension   This is a chronic problem. The current episode started more than 1 year ago. The problem has been waxing and waning since onset. The problem is controlled. Pertinent negatives include no chest pain, headaches, neck pain, palpitations or shortness of breath. Hyperlipidemia   This is a chronic problem. The current episode started more than 1 year ago. The problem is controlled. Recent lipid tests were reviewed and are variable. Pertinent negatives include no chest pain or shortness of breath. Hip Pain    The incident occurred more than 1 week ago. The incident occurred at home. There was no injury mechanism. The pain is present in the left hip. The pain is moderate. The pain has been fluctuating since onset. Pertinent negatives include no numbness.        Hemoglobin A1C (%)   Date Value   06/23/2020 6.2 (H)   03/19/2019 6.2 (H)   02/28/2018 6.1 (H)            No results found for: LABMICR  LDL Cholesterol (mg/dL)   Date Value   12/13/2019 134 (H)   03/19/2019 153 (H)   02/28/2018 145 (H)         AST (U/L)   Date Value   12/13/2019 19     ALT (U/L)   Date Value   12/13/2019 22     BUN (mg/dL)   Date Value   12/13/2019 18     BP Readings from Last 3 Encounters:   10/23/20 138/88   07/18/20 (!) 138/98   06/23/20 110/82              Past Medical History:   Diagnosis Date    Abnormal MRI of the head 02/2015    Italo Crook, endo eval incidental finding refered to [facility] rec 1 yr f/u MR    ED (erectile dysfunction)     Esophageal ulcer     EGD, Dr. James Tilley, with esophageal ulcer and some inflammation on biopsy, 05/11.  Fatty liver     incidental finding on CT 2/18    GERD (gastroesophageal reflux disease)     History of stress test     11/16  10.1 mets negative images    Hyperlipidemia     Hypertension     Previously treated with Norvasc.  Hypogonadism in male 11/14/2016    Dr Italo Crook felt Secondary - see endo consult notes in media    Hypothyroidism     previous h/o incresed TSH, then nl off meds    IFG (impaired fasting glucose)     Lung granuloma (HCC)     1 cm calcified granuloma R perihilar,  CT lung OK 2/18    Migraine headache     remote history of    Obesity     Post laminectomy syndrome     Following surgery [facility]  09/12. Epidurals were also done March 2012 and July 2012 by Dr. Onel Parks. MRI 11/16    Tobacco abuse       Past Surgical History:   Procedure Laterality Date    COLONOSCOPY  05/19/11    LUMBAR SPINE SURGERY  09/12    Lumbosacral spine procedure, Dr. Cl Calzada. [address]    Procedure for hypospadias.  OTHER SURGICAL HISTORY  03/22/04    Ureterocutaneous fistula repair.  OTHER SURGICAL HISTORY  08/26/05    Bimalleolar fracture repair with open reduction and internal fixation.  OTHER SURGICAL HISTORY  07/17/12, 3/20/12    Interlaminar epidural steroid injection.     OTHER SURGICAL HISTORY Bilateral 7/17/15, 2/20/15, 11/2014, 8/1/14, 4/24/14    L5 TFE    OTHER SURGICAL HISTORY Bilateral 1/08/16    L5 TFE    OTHER SURGICAL HISTORY Bilateral 07/01/2016    bilateral TFE    UMBILICAL HERNIA REPAIR  02/18/2016    with mesh     UPPER GASTROINTESTINAL ENDOSCOPY  05/19/11       Family History   Problem Relation Age of Onset    Arthritis Father     Diabetes Paternal Grandfather           Social History     Tobacco Use    Smoking status: Current Every Day Smoker     Packs/day: 0.50     Years: 5.00     Pack years: 2.50    Smokeless tobacco: Never Used   Substance Use Topics    Alcohol use: Yes     Alcohol/week: 0.0 standard drinks     Comment: Denies current use, 8/15/17. occasionally         Current Outpatient Medications   Medication Sig Dispense Refill    methylPREDNISolone (MEDROL DOSEPACK) 4 MG tablet Take by mouth. 1 kit 0    tadalafil (CIALIS) 20 MG tablet Take 1 tablet by mouth as needed for Erectile Dysfunction 36 tablet 3    atorvastatin (LIPITOR) 10 MG tablet Take 1 tablet by mouth daily 90 tablet 3    sertraline (ZOLOFT) 100 MG tablet Take 1 tablet by mouth daily 90 tablet 3    lisinopril-hydrochlorothiazide (PRINZIDE;ZESTORETIC) 10-12.5 MG per tablet Take 2 tablets by mouth daily      tiZANidine (ZANAFLEX) 4 MG tablet Take 1 tablet by mouth 3 times daily 20 tablet 0    oxyCODONE-acetaminophen (PERCOCET) 7.5-325 MG per tablet Take 1 tablet by mouth 4 times daily as needed. 0    Tens Unit MISC by Does not apply route. Pt needs eval for TENS unit replacement. The one he had ( with the back brace) was stolen recently.  1 each 0     Current Facility-Administered Medications   Medication Dose Route Frequency Provider Last Rate Last Dose    testosterone cypionate (DEPOTESTOTERONE CYPIONATE) injection 200 mg  200 mg Intramuscular Q21 Days Elio Valerio MD   200 mg at 11/18/14 1034     No Known Allergies    Health Maintenance   Topic Date Due    Pneumococcal 0-64 years Vaccine (1 of 1 - PPSV23) 11/26/2020 (Originally 10/11/1983)    Flu vaccine (1) 10/23/2021 (Originally 9/1/2020)    Lipid screen  12/13/2020    Potassium monitoring  12/13/2020    Creatinine monitoring  12/13/2020    A1C test (Diabetic or Prediabetic)  06/23/2021    TSH testing  06/23/2021    DTaP/Tdap/Td vaccine (2 - Td) 10/07/2026    HIV screen  Completed    Hepatitis A vaccine  Aged Out    Hepatitis B vaccine  Aged Out    Hib vaccine  Aged Out    Meningococcal (ACWY) vaccine  Aged Out       Subjective:      Review of Systems   Constitutional: Negative for chills and fever. HENT: Negative for hearing loss. Eyes: Negative for pain and visual disturbance. Respiratory: Negative for cough and shortness of breath. Cardiovascular: Negative for chest pain, palpitations and leg swelling. Gastrointestinal: Negative for abdominal pain, blood in stool, constipation, diarrhea, nausea and vomiting. Endocrine: Negative for cold intolerance, polydipsia and polyuria. Genitourinary: Negative for difficulty urinating, dysuria and hematuria. Musculoskeletal: Negative for arthralgias, back pain, gait problem and neck pain. Skin: Negative for pallor and rash. Neurological: Negative for dizziness, weakness, numbness and headaches. Hematological: Negative for adenopathy. Does not bruise/bleed easily. Psychiatric/Behavioral: Negative for confusion. The patient is not nervous/anxious. Objective:     Physical Exam  Vitals signs reviewed. Constitutional:       Appearance: He is well-developed. HENT:      Head: Normocephalic and atraumatic. Eyes:      Pupils: Pupils are equal, round, and reactive to light. Neck:      Musculoskeletal: Neck supple. Cardiovascular:      Rate and Rhythm: Normal rate and regular rhythm. Heart sounds: No murmur. No friction rub. No gallop. Pulmonary:      Effort: Pulmonary effort is normal.      Breath sounds: Normal breath sounds. No wheezing or rales. Abdominal:      General: There is no distension. Palpations: Abdomen is soft. There is no mass. Tenderness: There is no abdominal tenderness. There is no rebound. Musculoskeletal: Normal range of motion. Lymphadenopathy:      Cervical: No cervical adenopathy. Skin:     General: Skin is warm and dry. Findings: No rash. Neurological:      Mental Status: He is alert and oriented to person, place, and time. Cranial Nerves: No cranial nerve deficit (grossly). Psychiatric:         Thought Content: Thought content normal.        /88 (Site: Left Upper Arm, Position: Sitting, Cuff Size: Large Adult)   Pulse 98   Ht 6' (1.829 m)   Wt (!) 301 lb (136.5 kg)   BMI 40.82 kg/m²     Assessment:       Diagnosis Orders   1. Left groin pain  methylPREDNISolone (MEDROL DOSEPACK) 4 MG tablet    Amb External Referral To Physical Therapy   2. Essential hypertension     3. Other hyperlipidemia     4. Chronic left hip pain  methylPREDNISolone (MEDROL DOSEPACK) 4 MG tablet    XR HIP 2-3 VW W PELVIS LEFT    Amb External Referral To Physical Therapy   Will give patient Medrol Dosepak and will do physical therapy at the [facility]. .  If his pain/symptoms do not improve,  he knows to call and schedule follow-up appointment with his orthopedic surgeon Dr. Angely Fitzpatrick in [place]. Plan:       Return if symptoms worsen or fail to improve, for Annual Physical, Hypertension, Hyperlipidemia. Orders Placed This Encounter   Procedures    XR HIP 2-3 VW W PELVIS LEFT     Standing Status:   Future     Standing Expiration Date:   10/23/2021    Amb External Referral To Physical Therapy     Referral Priority:   Routine     Referral Type:   Consult for Advice and Opinion     Referral Reason:   Specialty Services Required     Requested Specialty:   Physical Therapy     Number of Visits Requested:   1     Orders Placed This Encounter   Medications    methylPREDNISolone (MEDROL DOSEPACK) 4 MG tablet     Sig: Take by mouth. Dispense:  1 kit     Refill:  0       Patientgiven educational materials - see patient instructions.   Discussed use, benefit,and side effects of prescribed medications. All patient questions answered. Ptvoiced understanding. Reviewed health maintenance. Instructed to continue currentmedications, diet and exercise. Patient agreed with treatment plan. Follow up asdirected.      Electronically signed by Hermes Braun MD on 10/23/2020 at 4:40 PM

## 2020-11-04 RX ORDER — OXYCODONE AND ACETAMINOPHEN 10; 325 MG/1; MG/1
1 TABLET ORAL EVERY 6 HOURS PRN
COMMUNITY
End: 2023-04-11

## 2020-11-09 ENCOUNTER — TELEPHONE (OUTPATIENT)
Dept: INTERNAL MEDICINE | Age: 45
End: 2020-11-09

## 2020-11-09 NOTE — TELEPHONE ENCOUNTER
You have patient off work through November 9th. He is asking for extension of 1-2 weeks per physical therapy. Can you dictate letter for this?   Let patient know [phone]

## 2020-11-09 NOTE — TELEPHONE ENCOUNTER
Off work note pended with return to work iff possible on 11/23/2020    Patient will  tomorrow afternoon

## 2020-12-23 ENCOUNTER — OFFICE VISIT (OUTPATIENT)
Dept: INTERNAL MEDICINE | Age: 45
End: 2020-12-23
Payer: COMMERCIAL

## 2020-12-23 VITALS
BODY MASS INDEX: 41.85 KG/M2 | HEART RATE: 100 BPM | WEIGHT: 309 LBS | SYSTOLIC BLOOD PRESSURE: 124 MMHG | HEIGHT: 72 IN | RESPIRATION RATE: 16 BRPM | DIASTOLIC BLOOD PRESSURE: 84 MMHG

## 2020-12-23 PROCEDURE — 99396 PREV VISIT EST AGE 40-64: CPT | Performed by: INTERNAL MEDICINE

## 2020-12-23 RX ORDER — LISINOPRIL AND HYDROCHLOROTHIAZIDE 12.5; 1 MG/1; MG/1
2 TABLET ORAL DAILY
Qty: 180 TABLET | Refills: 3 | Status: SHIPPED | OUTPATIENT
Start: 2020-12-23 | End: 2021-03-08 | Stop reason: SDUPTHER

## 2020-12-23 ASSESSMENT — ENCOUNTER SYMPTOMS
ABDOMINAL PAIN: 0
DIARRHEA: 0
SHORTNESS OF BREATH: 0
NAUSEA: 0
COUGH: 0
BLOOD IN STOOL: 0
EYE PAIN: 0
BACK PAIN: 0
VOMITING: 0
CONSTIPATION: 0

## 2020-12-23 NOTE — PROGRESS NOTES
[place] [address]  Dept: [phone]  Dept Fax: [phone]  Loc: [phone]     Britney Stewart is a 39 y.o. male who presents today for his medical conditions/complaintsas noted below. Britney Stewart is c/o of   Chief Complaint   Patient presents with    Annual Exam     6 month    Hypertension    Hyperlipidemia         HPI:     Hypertension  This is a chronic problem. The current episode started more than 1 year ago. The problem has been waxing and waning since onset. The problem is controlled. Pertinent negatives include no chest pain, headaches, neck pain, palpitations or shortness of breath. Hyperlipidemia  This is a chronic problem. The current episode started more than 1 year ago. The problem is controlled. Recent lipid tests were reviewed and are variable. Pertinent negatives include no chest pain or shortness of breath. Other  This is a recurrent (3- general medical exam) problem. The current episode started today. The problem occurs intermittently. The problem has been waxing and waning. Pertinent negatives include no abdominal pain, arthralgias, chest pain, chills, coughing, fever, headaches, nausea, neck pain, numbness, rash, vomiting or weakness.        Hemoglobin A1C (%)   Date Value   06/23/2020 6.2 (H)   03/19/2019 6.2 (H)   02/28/2018 6.1 (H)            No results found for: LABMICR  LDL Cholesterol (mg/dL)   Date Value   12/13/2019 134 (H)   03/19/2019 153 (H)   02/28/2018 145 (H)         AST (U/L)   Date Value   12/13/2019 19     ALT (U/L)   Date Value   12/13/2019 22     BUN (mg/dL)   Date Value   12/13/2019 18     BP Readings from Last 3 Encounters:   12/23/20 124/84   10/23/20 138/88   07/18/20 (!) 138/98              Past Medical History:   Diagnosis Date    Abnormal MRI of the head 02/2015 Italo Crook, endo eval incidental finding refered to [facility] rec 1 yr f/u MR    ED (erectile dysfunction)     Esophageal ulcer     EGD, Dr. James Tilley, with esophageal ulcer and some inflammation on biopsy, 05/11.  Fatty liver     incidental finding on CT 2/18    GERD (gastroesophageal reflux disease)     History of stress test     11/16  10.1 mets negative images    Hyperlipidemia     Hypertension     Previously treated with Norvasc.  Hypogonadism in male 11/14/2016    Dr Italo Crook felt Secondary - see endo consult notes in media    Hypothyroidism     previous h/o incresed TSH, then nl off meds    IFG (impaired fasting glucose)     Lung granuloma (HCC)     1 cm calcified granuloma R perihilar,  CT lung OK 2/18    Migraine headache     remote history of    Obesity     Post laminectomy syndrome     Following surgery [facility]  09/12. Epidurals were also done March 2012 and July 2012 by Dr. Onel Parks. MRI 11/16    Tobacco abuse       Past Surgical History:   Procedure Laterality Date    COLONOSCOPY  05/19/11    LUMBAR SPINE SURGERY  09/12    Lumbosacral spine procedure, Dr. Cl Calzada. [address] [facility]    Procedure for hypospadias.  OTHER SURGICAL HISTORY  03/22/04    Ureterocutaneous fistula repair.  OTHER SURGICAL HISTORY  08/26/05    Bimalleolar fracture repair with open reduction and internal fixation.  OTHER SURGICAL HISTORY  07/17/12, 3/20/12    Interlaminar epidural steroid injection.     OTHER SURGICAL HISTORY Bilateral 7/17/15, 2/20/15, 11/2014, 8/1/14, 4/24/14    L5 TFE    OTHER SURGICAL HISTORY Bilateral 1/08/16    L5 TFE    OTHER SURGICAL HISTORY Bilateral 07/01/2016    bilateral TFE    UMBILICAL HERNIA REPAIR  02/18/2016    with mesh     UPPER GASTROINTESTINAL ENDOSCOPY  05/19/11       Family History   Problem Relation Age of Onset    Arthritis Father     Diabetes Paternal Grandfather           Social History     Tobacco Use    Smoking status: Current Every Day Smoker Packs/day: 0.50     Years: 5.00     Pack years: 2.50    Smokeless tobacco: Never Used   Substance Use Topics    Alcohol use: Yes     Alcohol/week: 0.0 standard drinks     Comment: Denies current use, 8/15/17. occasionally         Current Outpatient Medications   Medication Sig Dispense Refill    lisinopril-hydroCHLOROthiazide (PRINZIDE;ZESTORETIC) 10-12.5 MG per tablet Take 2 tablets by mouth daily 180 tablet 3    diclofenac sodium (VOLTAREN) 1 % GEL Apply 2 g topically 4 times daily as needed for Pain      oxyCODONE-acetaminophen (PERCOCET)  MG per tablet Take 1 tablet by mouth every 6 hours as needed for Pain.  tadalafil (CIALIS) 20 MG tablet Take 1 tablet by mouth as needed for Erectile Dysfunction 36 tablet 3    atorvastatin (LIPITOR) 10 MG tablet Take 1 tablet by mouth daily 90 tablet 3    sertraline (ZOLOFT) 100 MG tablet Take 1 tablet by mouth daily 90 tablet 3    tiZANidine (ZANAFLEX) 4 MG tablet Take 1 tablet by mouth 3 times daily 20 tablet 0    Tens Unit MISC by Does not apply route. Pt needs eval for TENS unit replacement. The one he had ( with the back brace) was stolen recently.  1 each 0     Current Facility-Administered Medications   Medication Dose Route Frequency Provider Last Rate Last Admin    testosterone cypionate (DEPOTESTOTERONE CYPIONATE) injection 200 mg  200 mg Intramuscular Q21 Days Dameon Ambriz MD   200 mg at 11/18/14 5531     No Known Allergies    Health Maintenance   Topic Date Due    Hepatitis C screen  1975    Pneumococcal 0-64 years Vaccine (1 of 1 - PPSV23) 10/11/1983    Potassium monitoring  12/13/2020    Creatinine monitoring  12/13/2020    Lipid screen  12/13/2020    Flu vaccine (1) 10/23/2021 (Originally 9/1/2020)    A1C test (Diabetic or Prediabetic)  06/23/2021    TSH testing  06/23/2021    DTaP/Tdap/Td vaccine (2 - Td) 10/07/2026    HIV screen  Completed    Hepatitis A vaccine  Aged Out    Hepatitis B vaccine  Aged Out  Hib vaccine  Aged Out    Meningococcal (ACWY) vaccine  Aged Out       Subjective:      Review of Systems   Constitutional: Negative for chills and fever. HENT: Negative for hearing loss. Eyes: Negative for pain and visual disturbance. Respiratory: Negative for cough and shortness of breath. Cardiovascular: Negative for chest pain, palpitations and leg swelling. Gastrointestinal: Negative for abdominal pain, blood in stool, constipation, diarrhea, nausea and vomiting. Endocrine: Negative for cold intolerance, polydipsia and polyuria. Genitourinary: Negative for difficulty urinating, dysuria and hematuria. Musculoskeletal: Negative for arthralgias, back pain, gait problem and neck pain. Skin: Negative for pallor and rash. Neurological: Negative for dizziness, weakness, numbness and headaches. Hematological: Negative for adenopathy. Does not bruise/bleed easily. Psychiatric/Behavioral: Negative for confusion. The patient is not nervous/anxious. Objective:     Physical Exam  Vitals signs reviewed. Constitutional:       Appearance: He is well-developed. HENT:      Head: Normocephalic and atraumatic. Eyes:      Pupils: Pupils are equal, round, and reactive to light. Neck:      Musculoskeletal: Neck supple. Cardiovascular:      Rate and Rhythm: Normal rate and regular rhythm. Heart sounds: No murmur. No friction rub. No gallop. Pulmonary:      Effort: Pulmonary effort is normal.      Breath sounds: Normal breath sounds. No wheezing or rales. Abdominal:      General: There is no distension. Palpations: Abdomen is soft. There is no mass. Tenderness: There is no abdominal tenderness. There is no rebound. Musculoskeletal: Normal range of motion. Lymphadenopathy:      Cervical: No cervical adenopathy. Skin:     General: Skin is warm and dry. Findings: No rash. Neurological:      Mental Status: He is alert and oriented to person, place, and time. Cranial Nerves: No cranial nerve deficit (grossly). Psychiatric:         Thought Content: Thought content normal.        /84 (Site: Left Upper Arm, Position: Sitting, Cuff Size: Medium Adult)   Pulse 100   Resp 16   Ht 6' (1.829 m)   Wt (!) 309 lb (140.2 kg)   BMI 41.91 kg/m²     Assessment:       Diagnosis Orders   1. General medical exam     2. Essential hypertension  lisinopril-hydroCHLOROthiazide (PRINZIDE;ZESTORETIC) 10-12.5 MG per tablet   3. Other hyperlipidemia     4. IFG (impaired fasting glucose)  Hemoglobin A1C   5. Hypothyroidism (acquired)  TSH    T4, Free             Plan:       Return in about 6 months (around 6/23/2021) for Hypertension, Hyperlipidemia, hypothyroidism. Orders Placed This Encounter   Procedures    TSH     Standing Status:   Future     Standing Expiration Date:   12/23/2021    T4, Free     Standing Status:   Future     Standing Expiration Date:   12/23/2021    Hemoglobin A1C     Standing Status:   Future     Standing Expiration Date:   12/23/2021     Orders Placed This Encounter   Medications    lisinopril-hydroCHLOROthiazide (PRINZIDE;ZESTORETIC) 10-12.5 MG per tablet     Sig: Take 2 tablets by mouth daily     Dispense:  180 tablet     Refill:  3       Patientgiven educational materials - see patient instructions. Discussed use, benefit,and side effects of prescribed medications. All patient questions answered. Ptvoiced understanding. Reviewed health maintenance. Instructed to continue currentmedications, diet and exercise. Patient agreed with treatment plan. Follow up asdirected.      Electronically signed by Stu Villafana MD on 12/23/2020 at 8:58 AM

## 2021-03-08 DIAGNOSIS — I10 ESSENTIAL HYPERTENSION: ICD-10-CM

## 2021-03-08 RX ORDER — LISINOPRIL AND HYDROCHLOROTHIAZIDE 12.5; 1 MG/1; MG/1
2 TABLET ORAL DAILY
Qty: 180 TABLET | Refills: 3 | Status: SHIPPED | OUTPATIENT
Start: 2021-03-08 | End: 2022-06-20

## 2021-07-27 ENCOUNTER — OFFICE VISIT (OUTPATIENT)
Dept: INTERNAL MEDICINE | Age: 46
End: 2021-07-27
Payer: COMMERCIAL

## 2021-07-27 VITALS
HEIGHT: 72 IN | DIASTOLIC BLOOD PRESSURE: 82 MMHG | RESPIRATION RATE: 16 BRPM | HEART RATE: 88 BPM | BODY MASS INDEX: 40.77 KG/M2 | WEIGHT: 301 LBS | SYSTOLIC BLOOD PRESSURE: 122 MMHG

## 2021-07-27 DIAGNOSIS — D64.9 ANEMIA, UNSPECIFIED TYPE: ICD-10-CM

## 2021-07-27 DIAGNOSIS — I10 ESSENTIAL HYPERTENSION: Primary | ICD-10-CM

## 2021-07-27 DIAGNOSIS — E78.49 OTHER HYPERLIPIDEMIA: ICD-10-CM

## 2021-07-27 DIAGNOSIS — R73.01 IFG (IMPAIRED FASTING GLUCOSE): ICD-10-CM

## 2021-07-27 DIAGNOSIS — E34.9 HYPOTESTOSTERONISM: ICD-10-CM

## 2021-07-27 DIAGNOSIS — N52.9 ERECTILE DYSFUNCTION, UNSPECIFIED ERECTILE DYSFUNCTION TYPE: ICD-10-CM

## 2021-07-27 DIAGNOSIS — E03.9 HYPOTHYROIDISM, UNSPECIFIED TYPE: ICD-10-CM

## 2021-07-27 PROCEDURE — 99214 OFFICE O/P EST MOD 30 MIN: CPT | Performed by: INTERNAL MEDICINE

## 2021-07-27 RX ORDER — TESTOSTERONE CYPIONATE 200 MG/ML
200 INJECTION, SOLUTION INTRAMUSCULAR
Qty: 5 VIAL | Refills: 5 | Status: SHIPPED | OUTPATIENT
Start: 2021-07-27 | End: 2021-08-05 | Stop reason: SDUPTHER

## 2021-07-27 RX ORDER — TADALAFIL 20 MG/1
20 TABLET ORAL PRN
Qty: 36 TABLET | Refills: 3 | Status: SHIPPED | OUTPATIENT
Start: 2021-07-27 | End: 2022-06-20 | Stop reason: SDUPTHER

## 2021-07-27 SDOH — ECONOMIC STABILITY: FOOD INSECURITY: WITHIN THE PAST 12 MONTHS, THE FOOD YOU BOUGHT JUST DIDN'T LAST AND YOU DIDN'T HAVE MONEY TO GET MORE.: NEVER TRUE

## 2021-07-27 SDOH — ECONOMIC STABILITY: FOOD INSECURITY: WITHIN THE PAST 12 MONTHS, YOU WORRIED THAT YOUR FOOD WOULD RUN OUT BEFORE YOU GOT MONEY TO BUY MORE.: NEVER TRUE

## 2021-07-27 ASSESSMENT — PATIENT HEALTH QUESTIONNAIRE - PHQ9
1. LITTLE INTEREST OR PLEASURE IN DOING THINGS: 0
SUM OF ALL RESPONSES TO PHQ QUESTIONS 1-9: 0
2. FEELING DOWN, DEPRESSED OR HOPELESS: 0
SUM OF ALL RESPONSES TO PHQ9 QUESTIONS 1 & 2: 0
SUM OF ALL RESPONSES TO PHQ QUESTIONS 1-9: 0
SUM OF ALL RESPONSES TO PHQ QUESTIONS 1-9: 0

## 2021-07-27 ASSESSMENT — ENCOUNTER SYMPTOMS
NAUSEA: 0
BLOOD IN STOOL: 0
EYE PAIN: 0
ABDOMINAL PAIN: 0
VOMITING: 0
BACK PAIN: 0
COUGH: 0
SHORTNESS OF BREATH: 0
DIARRHEA: 0
CONSTIPATION: 0

## 2021-07-27 ASSESSMENT — SOCIAL DETERMINANTS OF HEALTH (SDOH): HOW HARD IS IT FOR YOU TO PAY FOR THE VERY BASICS LIKE FOOD, HOUSING, MEDICAL CARE, AND HEATING?: NOT HARD AT ALL

## 2021-07-27 NOTE — PROGRESS NOTES
[address]  [address] [place] [address]  Dept: [phone]  Dept Fax: [phone]  Loc: [phone]     Darion Lau is a 39 y.o. male who presents today for his medical conditions/complaintsas noted below. Darion Lau is c/o of   Chief Complaint   Patient presents with    Hypertension     6 month    Hyperlipidemia    Hypothyroidism         HPI:     Hypertension  This is a chronic (essential htn) problem. The current episode started more than 1 year ago. The problem has been waxing and waning since onset. The problem is controlled. Pertinent negatives include no chest pain, headaches, neck pain, palpitations or shortness of breath. Hyperlipidemia  This is a chronic problem. The current episode started more than 1 year ago. The problem is controlled. Recent lipid tests were reviewed and are variable. Pertinent negatives include no chest pain or shortness of breath. Other  This is a chronic (3-hypothyroidism) problem. The current episode started more than 1 year ago. The problem occurs constantly. The problem has been waxing and waning. Pertinent negatives include no abdominal pain, arthralgias, chest pain, chills, coughing, fever, headaches, nausea, neck pain, numbness, rash, vomiting or weakness.        Hemoglobin A1C (%)   Date Value   06/23/2020 6.2 (H)   03/19/2019 6.2 (H)   02/28/2018 6.1 (H)            No results found for: LABMICR  LDL Cholesterol (mg/dL)   Date Value   12/13/2019 134 (H)   03/19/2019 153 (H)   02/28/2018 145 (H)         AST (U/L)   Date Value   12/13/2019 19     ALT (U/L)   Date Value   12/13/2019 22     BUN (mg/dL)   Date Value   12/13/2019 18     BP Readings from Last 3 Encounters:   07/27/21 122/82   12/23/20 124/84   10/23/20 138/88              Past Medical History:   Diagnosis Date    Abnormal MRI of the head 02/2015    Amaury Early, endo eval incidental finding refered to [facility] rec 1 yr f/u MR    ED (erectile dysfunction)     Esophageal ulcer     EGD, Dr. Sunday Maddox, with esophageal ulcer and some inflammation on biopsy, 05/11.  Fatty liver     incidental finding on CT 2/18    GERD (gastroesophageal reflux disease)     History of stress test     11/16  10.1 mets negative images    Hyperlipidemia     Hypertension     Previously treated with Norvasc.  Hypogonadism in male 11/14/2016    Dr Mario Yang felt Secondary - see endo consult notes in media    Hypothyroidism     previous h/o incresed TSH, then nl off meds    IFG (impaired fasting glucose)     Lung granuloma (HCC)     1 cm calcified granuloma R perihilar,  CT lung OK 2/18    Migraine headache     remote history of    Obesity     Post laminectomy syndrome     Following surgery [facility]  09/12. Epidurals were also done March 2012 and July 2012 by Dr. Delores Plummer. MRI 11/16    Tobacco abuse       Past Surgical History:   Procedure Laterality Date    COLONOSCOPY  05/19/11    LUMBAR SPINE SURGERY  09/12    Lumbosacral spine procedure, Dr. Stephenie Zhang. [address]    Procedure for hypospadias.  OTHER SURGICAL HISTORY  03/22/04    Ureterocutaneous fistula repair.  OTHER SURGICAL HISTORY  08/26/05    Bimalleolar fracture repair with open reduction and internal fixation.  OTHER SURGICAL HISTORY  07/17/12, 3/20/12    Interlaminar epidural steroid injection.     OTHER SURGICAL HISTORY Bilateral 7/17/15, 2/20/15, 11/2014, 8/1/14, 4/24/14    L5 TFE    OTHER SURGICAL HISTORY Bilateral 1/08/16    L5 TFE    OTHER SURGICAL HISTORY Bilateral 07/01/2016    bilateral TFE    UMBILICAL HERNIA REPAIR  02/18/2016    with mesh     UPPER GASTROINTESTINAL ENDOSCOPY  05/19/11       Family History   Problem Relation Age of Onset    Arthritis Father     Diabetes Paternal Grandfather           Social History     Tobacco Use    Smoking status: Current Every Day Smoker     Packs/day: 0.50     Years: 5.00     Pack Skin:     General: Skin is warm and dry. Findings: No rash. Neurological:      Mental Status: He is alert and oriented to person, place, and time. Cranial Nerves: No cranial nerve deficit (grossly). Psychiatric:         Thought Content: Thought content normal.        /82 (Site: Left Upper Arm, Position: Sitting, Cuff Size: Large Adult)   Pulse 88   Resp 16   Ht 6' (1.829 m)   Wt (!) 301 lb (136.5 kg)   BMI 40.82 kg/m²     Assessment:       Diagnosis Orders   1. Essential hypertension     2. Hypothyroidism, unspecified type     3. Other hyperlipidemia     4. IFG (impaired fasting glucose)     5. Anemia, unspecified type     6. Erectile dysfunction, unspecified erectile dysfunction type  tadalafil (CIALIS) 20 MG tablet   7. Hypotestosteronism  DEPO-TESTOSTERONE 200 MG/ML injection      I reviewed multiple test results for this appointment. 6/23/2020 hemoglobin A1c okay at 6.2.    6/23/2020 normal TSH at 1.49.    6/23/2020 normal free T4 at 1.09. Patient told to continue Lipitor and Zestoretic. Plan:       Return in about 6 months (around 1/27/2022) for Annual Physical, Hypertension, Hyperlipidemia. No orders of the defined types were placed in this encounter. Orders Placed This Encounter   Medications    tadalafil (CIALIS) 20 MG tablet     Sig: Take 1 tablet by mouth as needed for Erectile Dysfunction     Dispense:  36 tablet     Refill:  3    DEPO-TESTOSTERONE 200 MG/ML injection     Sig: Inject 1 mL into the muscle every 14 days for 30 days. Inject 1 mL into the muscle every 14 days     Dispense:  5 vial     Refill:  5       Patientgiven educational materials - see patient instructions. Discussed use, benefit,and side effects of prescribed medications. All patient questions answered. Ptvoiced understanding. Reviewed health maintenance. Instructed to continue currentmedications, diet and exercise. Patient agreed with treatment plan. Follow up asdirected. Electronically signed by Blanca Sánchez MD on 7/27/2021 at 4:23 PM

## 2021-08-05 DIAGNOSIS — E34.9 HYPOTESTOSTERONISM: ICD-10-CM

## 2021-08-05 RX ORDER — TESTOSTERONE CYPIONATE 200 MG/ML
200 INJECTION, SOLUTION INTRAMUSCULAR
Qty: 5 VIAL | Refills: 5 | Status: SHIPPED | OUTPATIENT
Start: 2021-08-05 | End: 2022-06-20 | Stop reason: SDUPTHER

## 2021-08-05 NOTE — TELEPHONE ENCOUNTER
Can not get the insurance to approve his testosterone injections. He is going to use [facility] and wants [facility] to [facility].   Script pended

## 2021-08-09 RX ORDER — LISINOPRIL AND HYDROCHLOROTHIAZIDE 25; 20 MG/1; MG/1
1 TABLET ORAL DAILY
Qty: 90 TABLET | Refills: 3 | Status: SHIPPED | OUTPATIENT
Start: 2021-08-09 | End: 2022-06-14

## 2021-08-09 NOTE — TELEPHONE ENCOUNTER
Pharmacy sent a fax requesting a refill on his Lisinopril/HCTZ. He is currently on 10/12.5 2 tablets daily and the pharmacy is requesting a script for the 20/25 so that way he only has to take 1 pill a day instead of 2.     Script pended if approved

## 2021-10-08 ENCOUNTER — HOSPITAL ENCOUNTER (OUTPATIENT)
Dept: MRI IMAGING | Age: 46
Discharge: HOME | End: 2021-10-10
Payer: COMMERCIAL

## 2021-10-08 DIAGNOSIS — M54.16 LUMBAR RADICULOPATHY: ICD-10-CM

## 2021-10-08 PROCEDURE — 72148 MRI LUMBAR SPINE W/O DYE: CPT

## 2021-11-01 ENCOUNTER — OFFICE VISIT (OUTPATIENT)
Dept: OPTOMETRY | Age: 46
End: 2021-11-01
Payer: COMMERCIAL

## 2021-11-01 DIAGNOSIS — H52.203 HYPEROPIA OF BOTH EYES WITH ASTIGMATISM AND PRESBYOPIA: Primary | ICD-10-CM

## 2021-11-01 DIAGNOSIS — H52.4 HYPEROPIA OF BOTH EYES WITH ASTIGMATISM AND PRESBYOPIA: Primary | ICD-10-CM

## 2021-11-01 DIAGNOSIS — H52.03 HYPEROPIA OF BOTH EYES WITH ASTIGMATISM AND PRESBYOPIA: Primary | ICD-10-CM

## 2021-11-01 PROCEDURE — 92004 COMPRE OPH EXAM NEW PT 1/>: CPT | Performed by: OPTOMETRIST

## 2021-11-01 ASSESSMENT — VISUAL ACUITY
METHOD: SNELLEN - LINEAR
OS_SC: 20/30
OD_SC: 20/30
OD_SC: 20/30 OU
OS_SC+: -1
OD_SC+: -1

## 2021-11-01 ASSESSMENT — SLIT LAMP EXAM - LIDS
COMMENTS: NORMAL
COMMENTS: NORMAL

## 2021-11-01 ASSESSMENT — REFRACTION_MANIFEST
OS_ADD: +1.50
OS_AXIS: 017
OD_AXIS: 147
OD_SPHERE: +0.50
OD_CYLINDER: -1.00
OD_ADD: +1.50
OS_CYLINDER: -0.75
OS_SPHERE: +0.25

## 2021-11-01 ASSESSMENT — TONOMETRY
OS_IOP_MMHG: 11
IOP_METHOD: NON-CONTACT AIR PUFF
OD_IOP_MMHG: 15

## 2021-11-01 ASSESSMENT — ENCOUNTER SYMPTOMS
ALLERGIC/IMMUNOLOGIC NEGATIVE: 0
RESPIRATORY NEGATIVE: 0
EYES NEGATIVE: 0
GASTROINTESTINAL NEGATIVE: 0

## 2021-11-01 ASSESSMENT — KERATOMETRY
OS_AXISANGLE_DEGREES: 111
OS_K2POWER_DIOPTERS: 44.75
OS_AXISANGLE2_DEGREES: 021
OS_K1POWER_DIOPTERS: 43.75
METHOD_AUTO_MANUAL: AUTOMATED

## 2021-11-01 NOTE — PROGRESS NOTES
Holley Nguyen presents today for   Chief Complaint   Patient presents with    Blurred Vision    Vision Exam   .    HPI     Blurred Vision     In both eyes. Vision is blurred. Context:  reading. Comments     Last Vision Exam: none   Last Ophthalmology Exam: none  Last Filled Glasses Rx: none  Insurance: [employer]   Update: First eye exam  Reading is getting more blurry  Distance is ok. Current Outpatient Medications   Medication Sig Dispense Refill    lisinopril-hydroCHLOROthiazide (PRINZIDE;ZESTORETIC) 20-25 MG per tablet Take 1 tablet by mouth daily 90 tablet 3    tadalafil (CIALIS) 20 MG tablet Take 1 tablet by mouth as needed for Erectile Dysfunction 36 tablet 3    lisinopril-hydroCHLOROthiazide (PRINZIDE;ZESTORETIC) 10-12.5 MG per tablet Take 2 tablets by mouth daily 180 tablet 3    diclofenac sodium (VOLTAREN) 1 % GEL Apply 2 g topically 4 times daily as needed for Pain      oxyCODONE-acetaminophen (PERCOCET)  MG per tablet Take 1 tablet by mouth every 6 hours as needed for Pain.  atorvastatin (LIPITOR) 10 MG tablet Take 1 tablet by mouth daily 90 tablet 3    sertraline (ZOLOFT) 100 MG tablet Take 1 tablet by mouth daily 90 tablet 3    tiZANidine (ZANAFLEX) 4 MG tablet Take 1 tablet by mouth 3 times daily 20 tablet 0    Tens Unit MISC by Does not apply route. Pt needs eval for TENS unit replacement. The one he had ( with the back brace) was stolen recently. 1 each 0    DEPO-TESTOSTERONE 200 MG/ML injection Inject 1 mL into the muscle every 14 days for 30 days.  Inject 1 mL into the muscle every 14 days 5 vial 5     Current Facility-Administered Medications   Medication Dose Route Frequency Provider Last Rate Last Admin    testosterone cypionate (DEPOTESTOTERONE CYPIONATE) injection 200 mg  200 mg IntraMUSCular Q21 Days Ivon Lugo MD   200 mg at 11/18/14 5366         Family History   Problem Relation Age of Onset    Arthritis Father     Diabetes Paternal Grandfather      Social History     Socioeconomic History    Marital status:      Spouse name: None    Number of children: 1    Years of education: None    Highest education level: None   Occupational History     Employer: [employer]   Tobacco Use    Smoking status: Current Every Day Smoker     Packs/day: 0.50     Years: 5.00     Pack years: 2.50    Smokeless tobacco: Never Used   Vaping Use    Vaping Use: Never used   Substance and Sexual Activity    Alcohol use: Yes     Alcohol/week: 0.0 standard drinks     Comment: Denies current use, 8/15/17. occasionally    Drug use: Yes     Types: Marijuana Padmini Nohemy)    Sexual activity: Yes     Partners: Female   Other Topics Concern    None   Social History Narrative    None     Social Determinants of Health     Financial Resource Strain: Low Risk     Difficulty of Paying Living Expenses: Not hard at all   Food Insecurity: No Food Insecurity    Worried About Running Out of Food in the Last Year: Never true    [address] in the Last Year: Never true   Transportation Needs:     Lack of Transportation (Medical):      Lack of Transportation (Non-Medical):    Physical Activity:     Days of Exercise per Week:     Minutes of Exercise per Session:    Stress:     Feeling of Stress :    Social Connections:     Frequency of Communication with Friends and Family:     Frequency of Social Gatherings with Friends and Family:     Attends Taoism Services:     Active Member of Clubs or Organizations:     Attends Club or Organization Meetings:     Marital Status:    Intimate Partner Violence:     Fear of Current or Ex-Partner:     Emotionally Abused:     Physically Abused:     Sexually Abused:      Past Medical History:   Diagnosis Date    Abnormal MRI of the head 02/2015    Marshall Willoughby, endo eval incidental finding refered to [facility] 1 yr f/u MR    ED (erectile dysfunction)     Esophageal ulcer     EGD, Dr. Yeison Rider, with esophageal ulcer and some inflammation on biopsy, .  Fatty liver     incidental finding on CT     GERD (gastroesophageal reflux disease)     History of stress test       10.1 mets negative images    Hyperlipidemia     Hypertension     Previously treated with Norvasc.  Hypogonadism in male 2016    Dr Aubrey Jacob felt Secondary - see endo consult notes in media    Hypothyroidism     previous h/o incresed TSH, then nl off meds    IFG (impaired fasting glucose)     Lung granuloma (HCC)     1 cm calcified granuloma R perihilar,  CT lung OK     Migraine headache     remote history of    Obesity     Post laminectomy syndrome     Following surgery [facility]  . Epidurals were also done 2012 and 2012 by Dr. Cliff Aponte.   MRI     Tobacco abuse        ROS     Negative for: Constitutional, Gastrointestinal, Neurological, Skin, Genitourinary, Musculoskeletal, HENT, Endocrine, Cardiovascular, Eyes, Respiratory, Psychiatric, Allergic/Imm, Heme/Lymph          Main Ophthalmology Exam     External Exam       Right Left    External Normal Normal          Slit Lamp Exam       Right Left    Lids/Lashes Normal Normal    Conjunctiva/Sclera White and quiet White and quiet    Cornea Clear Clear    Anterior Chamber Deep and quiet Deep and quiet    Iris Round and reactive Round and reactive    Lens Clear Clear    Vitreous Normal Normal          Fundus Exam       Right Left    Disc Normal Normal    C/D Ratio 0.15 0.15    Macula Normal Normal    Vessels Normal Normal                   Tonometry     Tonometry (Non-contact air puff, 10:03 AM)       Right Left    Pressure 15 11   IOP.7             13.2  CH:  11.9          12.8  WS: 8.8          5.9                  Not recorded         Not recorded          Visual Acuity (Snellen - Linear)       Right Left    Dist sc 20/30 -1 20/30 -1    Near sc 20/30 OU           Pupils     Pupils       Pupils    Right PERRL    Left PERRL              Neuro/Psych Neuro/Psych     Oriented x3: Yes    Mood/Affect: Normal              Keratometry     Keratometry (Automated)       K1 Axis K2 Axis    Right        Left 43.75 021 44.75 111                  Ophthalmology Exam     Wearing Rx       Sphere    Right none    Left               Manifest Refraction     Manifest Refraction       Sphere Cylinder Axis Dist VA Add    Right +0.50 -1.00 147 20/20 +1.50    Left +0.25 -0.75 017 20/20 +1.50          Manifest Refraction #2 (Auto)       Sphere Cylinder Axis Dist VA Add    Right +0.75 -1.25 154      Left +0.50 -0.75 020                 Final Rx       Sphere Cylinder Axis Add    Right +0.50 -1.00 147 +1.50    Left +0.25 -0.75 017 +1.50    Type: Bifocal    Expiration Date: 11/2/2023      Final Rx #2       Sphere Cylinder Axis Add    Right +2.00 -1.00 147     Left +1.75 -0.75 017     Type: RRX     Expiration Date: 11/2/2023            No orders of the defined types were placed in this encounter. IMPRESSION:  1. Hyperopia of both eyes with astigmatism and presbyopia        PLAN:    1. New glases;   May fill rrx or bifocal;  rx printed       Patient Instructions   New glasses recommended      Return in about 2 years (around 11/1/2023) for complete eye exam.

## 2022-06-14 RX ORDER — LISINOPRIL AND HYDROCHLOROTHIAZIDE 25; 20 MG/1; MG/1
TABLET ORAL
Qty: 90 TABLET | Refills: 3 | Status: SHIPPED | OUTPATIENT
Start: 2022-06-14

## 2022-06-20 ENCOUNTER — OFFICE VISIT (OUTPATIENT)
Dept: INTERNAL MEDICINE | Age: 47
End: 2022-06-20
Payer: COMMERCIAL

## 2022-06-20 VITALS
HEART RATE: 92 BPM | HEIGHT: 72 IN | WEIGHT: 288 LBS | SYSTOLIC BLOOD PRESSURE: 136 MMHG | BODY MASS INDEX: 39.01 KG/M2 | RESPIRATION RATE: 16 BRPM | DIASTOLIC BLOOD PRESSURE: 88 MMHG

## 2022-06-20 DIAGNOSIS — R73.01 IFG (IMPAIRED FASTING GLUCOSE): ICD-10-CM

## 2022-06-20 DIAGNOSIS — Z00.00 GENERAL MEDICAL EXAM: Primary | ICD-10-CM

## 2022-06-20 DIAGNOSIS — I10 PRIMARY HYPERTENSION: ICD-10-CM

## 2022-06-20 DIAGNOSIS — K21.9 GASTROESOPHAGEAL REFLUX DISEASE WITHOUT ESOPHAGITIS: ICD-10-CM

## 2022-06-20 DIAGNOSIS — E03.9 HYPOTHYROIDISM (ACQUIRED): ICD-10-CM

## 2022-06-20 DIAGNOSIS — E78.49 OTHER HYPERLIPIDEMIA: ICD-10-CM

## 2022-06-20 DIAGNOSIS — N52.9 ERECTILE DYSFUNCTION, UNSPECIFIED ERECTILE DYSFUNCTION TYPE: ICD-10-CM

## 2022-06-20 DIAGNOSIS — E34.9 HYPOTESTOSTERONISM: ICD-10-CM

## 2022-06-20 DIAGNOSIS — D64.9 ANEMIA, UNSPECIFIED TYPE: ICD-10-CM

## 2022-06-20 PROCEDURE — 99396 PREV VISIT EST AGE 40-64: CPT | Performed by: INTERNAL MEDICINE

## 2022-06-20 RX ORDER — TESTOSTERONE CYPIONATE 200 MG/ML
200 INJECTION, SOLUTION INTRAMUSCULAR
Qty: 2 ML | Refills: 5 | Status: SHIPPED | OUTPATIENT
Start: 2022-06-20 | End: 2022-07-20

## 2022-06-20 RX ORDER — TADALAFIL 20 MG/1
20 TABLET ORAL PRN
Qty: 60 TABLET | Refills: 3 | Status: SHIPPED | OUTPATIENT
Start: 2022-06-20

## 2022-06-20 RX ORDER — OMEPRAZOLE 20 MG/1
20 CAPSULE, DELAYED RELEASE ORAL
Qty: 90 CAPSULE | Refills: 3 | Status: SHIPPED | OUTPATIENT
Start: 2022-06-20

## 2022-06-20 ASSESSMENT — PATIENT HEALTH QUESTIONNAIRE - PHQ9
2. FEELING DOWN, DEPRESSED OR HOPELESS: 0
SUM OF ALL RESPONSES TO PHQ QUESTIONS 1-9: 0
SUM OF ALL RESPONSES TO PHQ QUESTIONS 1-9: 0
1. LITTLE INTEREST OR PLEASURE IN DOING THINGS: 0
SUM OF ALL RESPONSES TO PHQ QUESTIONS 1-9: 0
SUM OF ALL RESPONSES TO PHQ9 QUESTIONS 1 & 2: 0
SUM OF ALL RESPONSES TO PHQ QUESTIONS 1-9: 0

## 2022-06-20 ASSESSMENT — ENCOUNTER SYMPTOMS
EYE PAIN: 0
DIARRHEA: 0
ABDOMINAL PAIN: 0
NAUSEA: 0
VOMITING: 0
SHORTNESS OF BREATH: 0
COUGH: 0
BLOOD IN STOOL: 0
CONSTIPATION: 0
BACK PAIN: 0

## 2022-06-20 NOTE — PROGRESS NOTES
[place] [address]  Dept: [phone]  Dept Fax: [phone]  Loc: [phone]     Gokul Osman is a 42 y.o. male who presents today for his medical conditions/complaintsas noted below. Gokul Osman is c/o of   Chief Complaint   Patient presents with    Annual Exam     6 month    Hypertension    Hyperlipidemia         HPI:     Hypertension  This is a chronic problem. The current episode started more than 1 year ago. The problem has been waxing and waning since onset. The problem is controlled. Pertinent negatives include no chest pain, headaches, neck pain, palpitations or shortness of breath. Hyperlipidemia  This is a chronic problem. The current episode started more than 1 year ago. The problem is controlled. Recent lipid tests were reviewed and are variable. Pertinent negatives include no chest pain or shortness of breath. Other  This is a recurrent (3-General medical exam) problem. The current episode started today. The problem occurs intermittently. The problem has been waxing and waning. Pertinent negatives include no abdominal pain, arthralgias, chest pain, chills, coughing, fever, headaches, nausea, neck pain, numbness, rash, vomiting or weakness.        Hemoglobin A1C (%)   Date Value   06/23/2020 6.2 (H)   03/19/2019 6.2 (H)   02/28/2018 6.1 (H)            No results found for: LABMICR  LDL Cholesterol (mg/dL)   Date Value   12/13/2019 134 (H)   03/19/2019 153 (H)   02/28/2018 145 (H)         AST (U/L)   Date Value   12/13/2019 19     ALT (U/L)   Date Value   12/13/2019 22     BUN (mg/dL)   Date Value   12/13/2019 18     BP Readings from Last 3 Encounters:   06/20/22 136/88   07/27/21 122/82   12/23/20 124/84              Past Medical History:   Diagnosis Date    Abnormal MRI of the head 02/2015    rosemary Reno incidental finding refered to [facility] rec 1 yr f/u MR    ED (erectile dysfunction)     Esophageal ulcer     EGD, Dr. Mike Ferrell, with esophageal ulcer and some inflammation on biopsy, 05/11.  Fatty liver     incidental finding on CT 2/18    GERD (gastroesophageal reflux disease)     History of stress test     11/16  10.1 mets negative images    Hyperlipidemia     Hypertension     Previously treated with Norvasc.  Hypogonadism in male 11/14/2016    Dr Boni Costa felt Secondary - see endo consult notes in media    Hypothyroidism     previous h/o incresed TSH, then nl off meds    IFG (impaired fasting glucose)     Lung granuloma (HCC)     1 cm calcified granuloma R perihilar,  CT lung OK 2/18    Migraine headache     remote history of    Obesity     Post laminectomy syndrome     Following surgery [facility]  09/12. Epidurals were also done March 2012 and July 2012 by Dr. Abdelrahman Palmer. MRI 11/16    Tobacco abuse       Past Surgical History:   Procedure Laterality Date    COLONOSCOPY  05/19/11    LUMBAR SPINE SURGERY  09/12    Lumbosacral spine procedure, Dr. Verna Goetz. [address]    Procedure for hypospadias.  OTHER SURGICAL HISTORY  03/22/04    Ureterocutaneous fistula repair.  OTHER SURGICAL HISTORY  08/26/05    Bimalleolar fracture repair with open reduction and internal fixation.  OTHER SURGICAL HISTORY  07/17/12, 3/20/12    Interlaminar epidural steroid injection.     OTHER SURGICAL HISTORY Bilateral 7/17/15, 2/20/15, 11/2014, 8/1/14, 4/24/14    L5 TFE    OTHER SURGICAL HISTORY Bilateral 1/08/16    L5 TFE    OTHER SURGICAL HISTORY Bilateral 07/01/2016    bilateral TFE    UMBILICAL HERNIA REPAIR  02/18/2016    with mesh     UPPER GASTROINTESTINAL ENDOSCOPY  05/19/11       Family History   Problem Relation Age of Onset    Arthritis Father     Diabetes Paternal Grandfather           Social History     Tobacco Use    Smoking status: Current Every Day Smoker     Packs/day: 0.50     Years: 5.00     Pack years: 2.50    Smokeless Completed    Hepatitis A vaccine  Aged Out    Hepatitis B vaccine  Aged Out    Hib vaccine  Aged Out    Meningococcal (ACWY) vaccine  Aged Out       Subjective:      Review of Systems   Constitutional: Negative for chills and fever. HENT: Negative for hearing loss. Eyes: Negative for pain and visual disturbance. Respiratory: Negative for cough and shortness of breath. Cardiovascular: Negative for chest pain, palpitations and leg swelling. Gastrointestinal: Negative for abdominal pain, blood in stool, constipation, diarrhea, nausea and vomiting. Endocrine: Negative for cold intolerance, polydipsia and polyuria. Genitourinary: Negative for difficulty urinating, dysuria and hematuria. Musculoskeletal: Negative for arthralgias, back pain, gait problem and neck pain. Skin: Negative for pallor and rash. Neurological: Negative for dizziness, weakness, numbness and headaches. Hematological: Negative for adenopathy. Does not bruise/bleed easily. Psychiatric/Behavioral: Negative for confusion. The patient is not nervous/anxious. Objective:     Physical Exam  Vitals reviewed. Constitutional:       Appearance: He is well-developed. HENT:      Head: Normocephalic and atraumatic. Eyes:      Pupils: Pupils are equal, round, and reactive to light. Cardiovascular:      Rate and Rhythm: Normal rate and regular rhythm. Heart sounds: No murmur heard. No friction rub. No gallop. Pulmonary:      Effort: Pulmonary effort is normal.      Breath sounds: Normal breath sounds. No wheezing or rales. Abdominal:      General: There is no distension. Palpations: Abdomen is soft. There is no mass. Tenderness: There is no abdominal tenderness. There is no rebound. Musculoskeletal:         General: Normal range of motion. Cervical back: Neck supple. Lymphadenopathy:      Cervical: No cervical adenopathy. Skin:     General: Skin is warm and dry. Findings: No rash. Neurological:      Mental Status: He is alert and oriented to person, place, and time. Cranial Nerves: No cranial nerve deficit (grossly). Psychiatric:         Thought Content: Thought content normal.        /88 (Site: Left Upper Arm, Position: Sitting, Cuff Size: Large Adult)   Pulse 92   Resp 16   Ht 6' (1.829 m)   Wt 288 lb (130.6 kg)   BMI 39.06 kg/m²     Assessment:       Diagnosis Orders   1. General medical exam     2. Hypotestosteronism  DEPO-TESTOSTERONE 200 MG/ML injection   3. Erectile dysfunction, unspecified erectile dysfunction type  tadalafil (CIALIS) 20 MG tablet   4. Primary hypertension  Comprehensive Metabolic Panel   5. Other hyperlipidemia  Lipid Panel   6. IFG (impaired fasting glucose)  Hemoglobin A1C    Hemoglobin A1C   7. Hypothyroidism (acquired)  TSH    T4, Free   8. Anemia, unspecified type  CBC with Auto Differential   9. Gastroesophageal reflux disease without esophagitis  omeprazole (PRILOSEC) 20 MG delayed release capsule             Plan:       Return in about 6 months (around 12/29/2022) for Hypertension, Hyperlipidemia.     Orders Placed This Encounter   Procedures    Hemoglobin A1C     Standing Status:   Future     Standing Expiration Date:   6/20/2023    TSH     Standing Status:   Future     Standing Expiration Date:   6/20/2023    T4, Free     Standing Status:   Future     Standing Expiration Date:   6/20/2023    CBC with Auto Differential     Standing Status:   Future     Standing Expiration Date:   6/20/2023    Comprehensive Metabolic Panel     Standing Status:   Future     Standing Expiration Date:   6/20/2023    Lipid Panel     Standing Status:   Future     Standing Expiration Date:   6/20/2023     Order Specific Question:   Is Patient Fasting?/# of Hours     Answer:   yes    Hemoglobin A1C     Standing Status:   Future     Standing Expiration Date:   6/20/2023     Orders Placed This Encounter   Medications    omeprazole (PRILOSEC) 20 MG delayed release capsule     Sig: Take 1 capsule by mouth every morning (before breakfast)     Dispense:  90 capsule     Refill:  3    tadalafil (CIALIS) 20 MG tablet     Sig: Take 1 tablet by mouth as needed for Erectile Dysfunction     Dispense:  60 tablet     Refill:  3    DEPO-TESTOSTERONE 200 MG/ML injection     Sig: Inject 1 mL into the muscle every 14 days for 30 days. Inject 1 mL into the muscle every 14 days     Dispense:  2 mL     Refill:  5       Patientgiven educational materials - see patient instructions. Discussed use, benefit,and side effects of prescribed medications. All patient questions answered. Ptvoiced understanding. Reviewed health maintenance. Instructed to continue currentmedications, diet and exercise. Patient agreed with treatment plan. Follow up asdirected.      Electronically signed by Mary Cheatham MD on 6/20/2022 at 10:46 AM

## 2022-06-28 ENCOUNTER — TELEPHONE (OUTPATIENT)
Dept: INTERNAL MEDICINE | Age: 47
End: 2022-06-28

## 2022-06-28 DIAGNOSIS — E34.9 HYPOTESTOSTERONISM: Primary | ICD-10-CM

## 2022-06-28 RX ORDER — TESTOSTERONE CYPIONATE 200 MG/ML
200 INJECTION INTRAMUSCULAR
Qty: 10 ML | Refills: 3 | Status: SHIPPED | OUTPATIENT
Start: 2022-06-28 | End: 2023-08-09

## 2022-06-28 NOTE — TELEPHONE ENCOUNTER
Patient was seen in office 6-   You prescribed depo-testosterone inj.   Insurance will not cover brand so he needs script for generic #5 vials sent to [place]

## 2022-12-22 ENCOUNTER — OFFICE VISIT (OUTPATIENT)
Dept: INTERNAL MEDICINE | Age: 47
End: 2022-12-22
Payer: COMMERCIAL

## 2022-12-22 VITALS
DIASTOLIC BLOOD PRESSURE: 78 MMHG | OXYGEN SATURATION: 97 % | HEART RATE: 90 BPM | RESPIRATION RATE: 16 BRPM | WEIGHT: 296 LBS | HEIGHT: 72 IN | BODY MASS INDEX: 40.09 KG/M2 | SYSTOLIC BLOOD PRESSURE: 118 MMHG

## 2022-12-22 DIAGNOSIS — E78.49 OTHER HYPERLIPIDEMIA: ICD-10-CM

## 2022-12-22 DIAGNOSIS — K21.9 GASTROESOPHAGEAL REFLUX DISEASE WITHOUT ESOPHAGITIS: ICD-10-CM

## 2022-12-22 DIAGNOSIS — E03.9 ACQUIRED HYPOTHYROIDISM: ICD-10-CM

## 2022-12-22 DIAGNOSIS — E34.9 HYPOTESTOSTERONISM: ICD-10-CM

## 2022-12-22 DIAGNOSIS — R73.01 IFG (IMPAIRED FASTING GLUCOSE): ICD-10-CM

## 2022-12-22 DIAGNOSIS — I10 PRIMARY HYPERTENSION: Primary | ICD-10-CM

## 2022-12-22 PROCEDURE — 3078F DIAST BP <80 MM HG: CPT | Performed by: INTERNAL MEDICINE

## 2022-12-22 PROCEDURE — 3074F SYST BP LT 130 MM HG: CPT | Performed by: INTERNAL MEDICINE

## 2022-12-22 PROCEDURE — 99214 OFFICE O/P EST MOD 30 MIN: CPT | Performed by: INTERNAL MEDICINE

## 2022-12-22 RX ORDER — TESTOSTERONE CYPIONATE 200 MG/ML
200 INJECTION INTRAMUSCULAR
Qty: 12 ML | Refills: 1 | Status: SHIPPED | OUTPATIENT
Start: 2022-12-22 | End: 2023-03-22

## 2022-12-22 RX ORDER — PREGABALIN 75 MG/1
75 CAPSULE ORAL 2 TIMES DAILY
COMMUNITY

## 2022-12-22 RX ORDER — PREGABALIN 150 MG/1
75 CAPSULE ORAL 2 TIMES DAILY
COMMUNITY
End: 2022-12-22

## 2022-12-22 SDOH — ECONOMIC STABILITY: FOOD INSECURITY: WITHIN THE PAST 12 MONTHS, THE FOOD YOU BOUGHT JUST DIDN'T LAST AND YOU DIDN'T HAVE MONEY TO GET MORE.: NEVER TRUE

## 2022-12-22 SDOH — ECONOMIC STABILITY: FOOD INSECURITY: WITHIN THE PAST 12 MONTHS, YOU WORRIED THAT YOUR FOOD WOULD RUN OUT BEFORE YOU GOT MONEY TO BUY MORE.: NEVER TRUE

## 2022-12-22 ASSESSMENT — ENCOUNTER SYMPTOMS
DIARRHEA: 0
ABDOMINAL PAIN: 0
BLOOD IN STOOL: 0
CONSTIPATION: 0
SHORTNESS OF BREATH: 0
BACK PAIN: 0
NAUSEA: 0
VOMITING: 0
EYE PAIN: 0
COUGH: 0

## 2022-12-22 ASSESSMENT — SOCIAL DETERMINANTS OF HEALTH (SDOH): HOW HARD IS IT FOR YOU TO PAY FOR THE VERY BASICS LIKE FOOD, HOUSING, MEDICAL CARE, AND HEATING?: NOT HARD AT ALL

## 2022-12-22 NOTE — PROGRESS NOTES
[place] [address]  Dept: [phone]  Dept Fax: [phone]  Loc: [phone]     Suleiman Davis is a 41 y.o. male who presents today for his medical conditions/complaintsas noted below. Suleiman Davis is c/o of   Chief Complaint   Patient presents with    Hypertension     6 month  Has stated that all of his joints have been very achy over the last several months. States his prilosec does not help anymore and wants to switch to Nexium. Hyperlipidemia    Hypothyroidism         HPI:     Hypertension  This is a chronic problem. The current episode started more than 1 year ago. The problem has been waxing and waning since onset. The problem is controlled. Pertinent negatives include no chest pain, headaches, neck pain, palpitations or shortness of breath. Hyperlipidemia  This is a chronic problem. The current episode started more than 1 year ago. The problem is controlled. Recent lipid tests were reviewed and are variable. Pertinent negatives include no chest pain or shortness of breath. Other  This is a recurrent (3-general medical exam) problem. The current episode started today. The problem occurs intermittently. The problem has been waxing and waning. Pertinent negatives include no abdominal pain, arthralgias, chest pain, chills, coughing, fever, headaches, nausea, neck pain, numbness, rash, vomiting or weakness.      Hemoglobin A1C (%)   Date Value   06/23/2020 6.2 (H)   03/19/2019 6.2 (H)   02/28/2018 6.1 (H)            No results found for: LABMICR  LDL Cholesterol (mg/dL)   Date Value   12/13/2019 134 (H)   03/19/2019 153 (H)   02/28/2018 145 (H)         AST (U/L)   Date Value   12/13/2019 19     ALT (U/L)   Date Value   12/13/2019 22     BUN (mg/dL)   Date Value   12/13/2019 18     BP Readings from Last 3 Encounters:   12/22/22 118/78   06/20/22 136/88   07/27/21 122/82 Past Medical History:   Diagnosis Date    Abnormal MRI of the head 02/2015    Ladonna Manning, endo eval incidental finding refered to [facility] rec 1 yr f/u MR    ED (erectile dysfunction)     Esophageal ulcer     EGD, Dr. Heather Hollis, with esophageal ulcer and some inflammation on biopsy, 05/11. Fatty liver     incidental finding on CT 2/18    GERD (gastroesophageal reflux disease)     History of stress test     11/16  10.1 mets negative images    Hyperlipidemia     Hypertension     Previously treated with Norvasc. Hypogonadism in male 11/14/2016    Dr Ladonna Manning felt Secondary - see endo consult notes in media    Hypothyroidism     previous h/o incresed TSH, then nl off meds    IFG (impaired fasting glucose)     Lung granuloma ([address]     1 cm calcified granuloma R perihilar,  CT lung OK 2/18    Migraine headache     remote history of    Obesity     Post laminectomy syndrome     Following surgery [facility]  09/12. Epidurals were also done March 2012 and July 2012 by Dr. Jaiden March. MRI 11/16    Tobacco abuse       Past Surgical History:   Procedure Laterality Date    COLONOSCOPY  05/19/11    LUMBAR SPINE SURGERY  09/12    Lumbosacral spine procedure, Dr. Ema Chu. OTHER SURGICAL HISTORY  1980    Procedure for hypospadias. OTHER SURGICAL HISTORY  03/22/04    Ureterocutaneous fistula repair. OTHER SURGICAL HISTORY  08/26/05    Bimalleolar fracture repair with open reduction and internal fixation. OTHER SURGICAL HISTORY  07/17/12, 3/20/12    Interlaminar epidural steroid injection.     OTHER SURGICAL HISTORY Bilateral 7/17/15, 2/20/15, 11/2014, 8/1/14, 4/24/14    L5 TFE    OTHER SURGICAL HISTORY Bilateral 1/08/16    L5 TFE    OTHER SURGICAL HISTORY Bilateral 07/01/2016    bilateral TFE    UMBILICAL HERNIA REPAIR  02/18/2016    with mesh     UPPER GASTROINTESTINAL ENDOSCOPY  05/19/11       Family History   Problem Relation Age of Onset    Arthritis Father     Diabetes Paternal Grandfather           Social History Tobacco Use    Smoking status: Every Day     Packs/day: 0.50     Years: 5.00     Pack years: 2.50     Types: Cigarettes    Smokeless tobacco: Never   Substance Use Topics    Alcohol use: Yes     Alcohol/week: 0.0 standard drinks     Comment: Denies current use, 8/15/17. occasionally         Current Outpatient Medications   Medication Sig Dispense Refill    pregabalin (LYRICA) 75 MG capsule Take 75 mg by mouth 2 times daily. esomeprazole (NEXIUM) 20 MG delayed release capsule Take 1 capsule by mouth daily 90 capsule 3    testosterone cypionate (DEPOTESTOTERONE CYPIONATE) 200 MG/ML injection Inject 1 mL into the muscle every 14 days for 90 days. Max Daily Amount: 200 mg 12 mL 1    SYRINGE-NEEDLE, DISP, 3 ML 25G X 1\" 3 ML MISC every 14 days for Testosterone injections 6 each 3    tadalafil (CIALIS) 20 MG tablet Take 1 tablet by mouth as needed for Erectile Dysfunction 60 tablet 3    lisinopril-hydroCHLOROthiazide (PRINZIDE;ZESTORETIC) 20-25 MG per tablet TAKE 1 TABLET DAILY 90 tablet 3    diclofenac sodium (VOLTAREN) 1 % GEL Apply 2 g topically 4 times daily as needed for Pain      oxyCODONE-acetaminophen (PERCOCET)  MG per tablet Take 1 tablet by mouth every 6 hours as needed for Pain. atorvastatin (LIPITOR) 10 MG tablet Take 1 tablet by mouth daily 90 tablet 3    tiZANidine (ZANAFLEX) 4 MG tablet Take 1 tablet by mouth 3 times daily 20 tablet 0    Tens Unit MISC by Does not apply route. Pt needs eval for TENS unit replacement. The one he had ( with the back brace) was stolen recently.  1 each 0     Current Facility-Administered Medications   Medication Dose Route Frequency Provider Last Rate Last Admin    testosterone cypionate (DEPOTESTOTERONE CYPIONATE) injection 200 mg  200 mg IntraMUSCular Q21 Days Melanie Mccann MD   200 mg at 11/18/14 6964     No Known Allergies    Health Maintenance   Topic Date Due    Pneumococcal 0-64 years Vaccine (1 - PCV) Never done    Hepatitis C screen  Never done    Lipids  12/13/2020    COVID-19 Vaccine (2 - Pfizer series) 06/22/2021    A1C test (Diabetic or Prediabetic)  06/23/2021    Flu vaccine (1) 08/01/2022    Colorectal Cancer Screen  10/11/2022    Depression Screen  06/20/2023    DTaP/Tdap/Td vaccine (2 - Td or Tdap) 10/07/2026    HIV screen  Completed    Hepatitis A vaccine  Aged Out    Hib vaccine  Aged Out    Meningococcal (ACWY) vaccine  Aged Out       Subjective:      Review of Systems   Constitutional:  Negative for chills and fever. HENT:  Negative for hearing loss. Eyes:  Negative for pain and visual disturbance. Respiratory:  Negative for cough and shortness of breath. Cardiovascular:  Negative for chest pain, palpitations and leg swelling. Gastrointestinal:  Negative for abdominal pain, blood in stool, constipation, diarrhea, nausea and vomiting. Endocrine: Negative for cold intolerance, polydipsia and polyuria. Genitourinary:  Negative for difficulty urinating, dysuria and hematuria. Musculoskeletal:  Negative for arthralgias, back pain, gait problem and neck pain. Skin:  Negative for pallor and rash. Neurological:  Negative for dizziness, weakness, numbness and headaches. Hematological:  Negative for adenopathy. Does not bruise/bleed easily. Psychiatric/Behavioral:  Negative for confusion. The patient is not nervous/anxious. Objective:     Physical Exam  Vitals reviewed. Constitutional:       Appearance: He is well-developed. HENT:      Head: Normocephalic and atraumatic. Eyes:      Pupils: Pupils are equal, round, and reactive to light. Cardiovascular:      Rate and Rhythm: Normal rate and regular rhythm. Heart sounds: No murmur heard. No friction rub. No gallop. Pulmonary:      Effort: Pulmonary effort is normal.      Breath sounds: Normal breath sounds. No wheezing or rales. Abdominal:      General: There is no distension. Palpations: Abdomen is soft. There is no mass. Tenderness:  There is no abdominal tenderness. There is no rebound. Musculoskeletal:         General: Normal range of motion. Cervical back: Neck supple. Lymphadenopathy:      Cervical: No cervical adenopathy. Skin:     General: Skin is warm and dry. Findings: No rash. Neurological:      Mental Status: He is alert and oriented to person, place, and time. Cranial Nerves: No cranial nerve deficit (grossly). Psychiatric:         Thought Content: Thought content normal.      /78 (Site: Left Upper Arm, Position: Sitting, Cuff Size: Large Adult)   Pulse 90   Resp 16   Ht 6' (1.829 m)   Wt 296 lb (134.3 kg)   SpO2 97%   BMI 40.14 kg/m²     Assessment:       Diagnosis Orders   1. Primary hypertension        2. Hypotestosteronism  testosterone cypionate (DEPOTESTOTERONE CYPIONATE) 200 MG/ML injection      3. Other hyperlipidemia        4. Acquired hypothyroidism        5. Gastroesophageal reflux disease without esophagitis  esomeprazole (NEXIUM) 20 MG delayed release capsule      6. IFG (impaired fasting glucose)  Hemoglobin A1C      Patient will be doing new labs soon. Previous 3/19/2019 total cholesterol was 250.    6/23/2020 TSH normal at 1.49    6/23/2020 A1c okay at 6.2    Patient told to continue Lipitor. Plan:       Return in about 27 weeks (around 6/29/2023) for Annual Physical, Hypertension, Hyperlipidemia. Orders Placed This Encounter   Procedures    Hemoglobin A1C     Standing Status:   Future     Standing Expiration Date:   12/22/2023       Orders Placed This Encounter   Medications    esomeprazole (NEXIUM) 20 MG delayed release capsule     Sig: Take 1 capsule by mouth daily     Dispense:  90 capsule     Refill:  3    testosterone cypionate (DEPOTESTOTERONE CYPIONATE) 200 MG/ML injection     Sig: Inject 1 mL into the muscle every 14 days for 90 days. Max Daily Amount: 200 mg     Dispense:  12 mL     Refill:  1         Patientgiven educational materials - see patient instructions. Discussed use, benefit,and side effects of prescribed medications. All patient questions answered. Ptvoiced understanding. Reviewed health maintenance. Instructed to continue currentmedications, diet and exercise. Patient agreed with treatment plan. Follow up asdirected.      Electronically signed by Marla Correia MD on 12/22/2022 at 9:17 AM

## 2023-02-08 ENCOUNTER — HOSPITAL ENCOUNTER (OUTPATIENT)
Age: 48
Discharge: HOME | End: 2023-02-08
Payer: COMMERCIAL

## 2023-02-08 DIAGNOSIS — E03.9 HYPOTHYROIDISM (ACQUIRED): ICD-10-CM

## 2023-02-08 DIAGNOSIS — I10 PRIMARY HYPERTENSION: ICD-10-CM

## 2023-02-08 DIAGNOSIS — R73.01 IFG (IMPAIRED FASTING GLUCOSE): ICD-10-CM

## 2023-02-08 DIAGNOSIS — D64.9 ANEMIA, UNSPECIFIED TYPE: ICD-10-CM

## 2023-02-08 DIAGNOSIS — E78.49 OTHER HYPERLIPIDEMIA: ICD-10-CM

## 2023-02-08 LAB
ABSOLUTE EOS #: 0.15 K/UL (ref 0–0.44)
ABSOLUTE IMMATURE GRANULOCYTE: <0.03 K/UL (ref 0–0.3)
ABSOLUTE LYMPH #: 3.93 K/UL (ref 1.1–3.7)
ABSOLUTE MONO #: 0.8 K/UL (ref 0.1–1.2)
ALBUMIN SERPL-MCNC: 4.3 G/DL (ref 3.5–5.2)
ALBUMIN/GLOBULIN RATIO: 1.4 (ref 1–2.5)
ALP SERPL-CCNC: 95 U/L (ref 40–129)
ALT SERPL-CCNC: 13 U/L (ref 5–41)
ANION GAP SERPL CALCULATED.3IONS-SCNC: 9 MMOL/L (ref 9–17)
AST SERPL-CCNC: 13 U/L
BASOPHILS # BLD: 1 % (ref 0–2)
BASOPHILS ABSOLUTE: 0.05 K/UL (ref 0–0.2)
BILIRUB SERPL-MCNC: 0.5 MG/DL (ref 0.3–1.2)
BUN SERPL-MCNC: 11 MG/DL (ref 6–20)
BUN/CREAT BLD: 15 (ref 9–20)
CALCIUM SERPL-MCNC: 9.2 MG/DL (ref 8.6–10.4)
CHLORIDE SERPL-SCNC: 103 MMOL/L (ref 98–107)
CHOLEST SERPL-MCNC: 219 MG/DL
CHOLESTEROL/HDL RATIO: 4.9
CO2 SERPL-SCNC: 27 MMOL/L (ref 20–31)
CREAT SERPL-MCNC: 0.71 MG/DL (ref 0.7–1.2)
EOSINOPHILS RELATIVE PERCENT: 2 % (ref 1–4)
EST. AVERAGE GLUCOSE BLD GHB EST-MCNC: 128 MG/DL
GFR SERPL CREATININE-BSD FRML MDRD: >60 ML/MIN/1.73M2
GLUCOSE SERPL-MCNC: 100 MG/DL (ref 70–99)
HBA1C MFR BLD: 6.1 % (ref 4–6)
HCT VFR BLD AUTO: 40.8 % (ref 40.7–50.3)
HDLC SERPL-MCNC: 45 MG/DL
HGB BLD-MCNC: 13.7 G/DL (ref 13–17)
IMMATURE GRANULOCYTES: 0 %
LDLC SERPL CALC-MCNC: 141 MG/DL (ref 0–130)
LYMPHOCYTES # BLD: 40 % (ref 24–43)
MCH RBC QN AUTO: 31.3 PG (ref 25.2–33.5)
MCHC RBC AUTO-ENTMCNC: 33.6 G/DL (ref 25.2–33.5)
MCV RBC AUTO: 93.2 FL (ref 82.6–102.9)
MONOCYTES # BLD: 8 % (ref 3–12)
NRBC AUTOMATED: 0 PER 100 WBC
PDW BLD-RTO: 12.8 % (ref 11.8–14.4)
PLATELET # BLD AUTO: 251 K/UL (ref 138–453)
PMV BLD AUTO: 10.9 FL (ref 8.1–13.5)
POTASSIUM SERPL-SCNC: 3.8 MMOL/L (ref 3.7–5.3)
PROT SERPL-MCNC: 7.3 G/DL (ref 6.4–8.3)
RBC # BLD: 4.38 M/UL (ref 4.21–5.77)
SEG NEUTROPHILS: 49 % (ref 36–65)
SEGMENTED NEUTROPHILS ABSOLUTE COUNT: 4.83 K/UL (ref 1.5–8.1)
SODIUM SERPL-SCNC: 139 MMOL/L (ref 135–144)
T4 FREE SERPL-MCNC: 1.1 NG/DL (ref 0.93–1.7)
TRIGL SERPL-MCNC: 163 MG/DL
TSH SERPL-ACNC: 0.89 UIU/ML (ref 0.3–5)
WBC # BLD AUTO: 9.8 K/UL (ref 3.5–11.3)

## 2023-02-08 PROCEDURE — 84443 ASSAY THYROID STIM HORMONE: CPT

## 2023-02-08 PROCEDURE — 80053 COMPREHEN METABOLIC PANEL: CPT

## 2023-02-08 PROCEDURE — 85025 COMPLETE CBC W/AUTO DIFF WBC: CPT

## 2023-02-08 PROCEDURE — 80061 LIPID PANEL: CPT

## 2023-02-08 PROCEDURE — 84439 ASSAY OF FREE THYROXINE: CPT

## 2023-02-08 PROCEDURE — 36415 COLL VENOUS BLD VENIPUNCTURE: CPT

## 2023-02-08 PROCEDURE — 83036 HEMOGLOBIN GLYCOSYLATED A1C: CPT

## 2023-02-10 DIAGNOSIS — I10 ESSENTIAL HYPERTENSION: ICD-10-CM

## 2023-02-10 RX ORDER — ATORVASTATIN CALCIUM 10 MG/1
10 TABLET, FILM COATED ORAL DAILY
Qty: 90 TABLET | Refills: 3 | Status: SHIPPED | OUTPATIENT
Start: 2023-02-10

## 2023-02-10 NOTE — TELEPHONE ENCOUNTER
Refill request     Medication pended if agreeable    Last Appt:  12/22/2022  Next Appt:   7/3/2023  Med verified in Epic

## 2023-03-13 ENCOUNTER — OFFICE VISIT (OUTPATIENT)
Dept: INTERNAL MEDICINE | Age: 48
End: 2023-03-13
Payer: COMMERCIAL

## 2023-03-13 VITALS
BODY MASS INDEX: 39.93 KG/M2 | HEIGHT: 72 IN | WEIGHT: 294.8 LBS | SYSTOLIC BLOOD PRESSURE: 138 MMHG | DIASTOLIC BLOOD PRESSURE: 92 MMHG | HEART RATE: 88 BPM

## 2023-03-13 DIAGNOSIS — E66.01 SEVERE OBESITY (BMI 35.0-39.9) WITH COMORBIDITY (HCC): ICD-10-CM

## 2023-03-13 DIAGNOSIS — M25.552 LEFT HIP PAIN: Primary | ICD-10-CM

## 2023-03-13 DIAGNOSIS — M96.1 POST LAMINECTOMY SYNDROME: ICD-10-CM

## 2023-03-13 PROCEDURE — 3075F SYST BP GE 130 - 139MM HG: CPT | Performed by: NURSE PRACTITIONER

## 2023-03-13 PROCEDURE — 99213 OFFICE O/P EST LOW 20 MIN: CPT | Performed by: NURSE PRACTITIONER

## 2023-03-13 PROCEDURE — 3080F DIAST BP >= 90 MM HG: CPT | Performed by: NURSE PRACTITIONER

## 2023-03-13 SDOH — ECONOMIC STABILITY: FOOD INSECURITY: WITHIN THE PAST 12 MONTHS, THE FOOD YOU BOUGHT JUST DIDN'T LAST AND YOU DIDN'T HAVE MONEY TO GET MORE.: NEVER TRUE

## 2023-03-13 SDOH — ECONOMIC STABILITY: HOUSING INSECURITY
IN THE LAST 12 MONTHS, WAS THERE A TIME WHEN YOU DID NOT HAVE A STEADY PLACE TO SLEEP OR SLEPT IN A SHELTER (INCLUDING NOW)?: NO

## 2023-03-13 SDOH — ECONOMIC STABILITY: FOOD INSECURITY: WITHIN THE PAST 12 MONTHS, YOU WORRIED THAT YOUR FOOD WOULD RUN OUT BEFORE YOU GOT MONEY TO BUY MORE.: NEVER TRUE

## 2023-03-13 SDOH — ECONOMIC STABILITY: INCOME INSECURITY: HOW HARD IS IT FOR YOU TO PAY FOR THE VERY BASICS LIKE FOOD, HOUSING, MEDICAL CARE, AND HEATING?: NOT VERY HARD

## 2023-03-13 ASSESSMENT — ENCOUNTER SYMPTOMS
ABDOMINAL PAIN: 0
BLOOD IN STOOL: 0
EYE PAIN: 0
CHEST TIGHTNESS: 0
FACIAL SWELLING: 0
VOMITING: 0
SHORTNESS OF BREATH: 0
CONSTIPATION: 0
WHEEZING: 0
COLOR CHANGE: 0
RHINORRHEA: 0
SORE THROAT: 0
NAUSEA: 0
COUGH: 0
DIARRHEA: 0
TROUBLE SWALLOWING: 0
SINUS PRESSURE: 0

## 2023-03-13 NOTE — Clinical Note
March 13, 2023       Naty Sebastian YOB: 1975   [address] [address] Date of Visit:  3/13/2023       To Whom It May Concern: It is my medical opinion that Naty Sebastian {Work release (duty restriction):55658}. If you have any questions or concerns, please don't hesitate to call.     Sincerely,        Marnie Chavira, APRN - CNP

## 2023-03-13 NOTE — LETTER
March 13, 2023       Donna Carvalho YOB: 1975   [facility] [address] Date of Visit:  3/13/2023       To Whom It May Concern:    Please excuse Donna Carvalho from work that started with an illness 3/10/23 and is to remain off work until 3/18/23. He may return to work without restrictions unless otherwise noted by specialist.     If you have any questions or concerns, please don't hesitate to call.     Sincerely,        Alejandra Alexander, APRN - CNP

## 2023-03-13 NOTE — PROGRESS NOTES
03/13/23  Mary Villa  1975      Chief Complaint:   1. Left hip pain    2. Severe obesity (BMI 35.0-39. 9) with comorbidity ([address]    3. Post laminectomy syndrome        HPI:  Patient comes in with worsening left hip pain. States he has been following with his pain management through [facility] recently received a back injection a week ago and was set up for this Thursday for another back injection. Pain meds prescribed through pain management. States recently felt his left hip pop. Feels it is popping more frequently. Previously seen by Dr. Alvaro Winter and was told to return to see him when the pain was unbearable. Patient asking for referral back to Dr. Alvaro Winter and to have a off work note for the week as he is unable to walk short distances    No Known Allergies    Past Medical History:   Diagnosis Date    Abnormal MRI of the head 02/2015    Danette uGnn, rosemary eval incidental finding refered to [facility] rec 1 yr f/u MR    ED (erectile dysfunction)     Esophageal ulcer     EGD, Dr. Albaro Michelle, with esophageal ulcer and some inflammation on biopsy, 05/11. Fatty liver     incidental finding on CT 2/18    GERD (gastroesophageal reflux disease)     History of stress test     11/16  10.1 mets negative images    Hyperlipidemia     Hypertension     Previously treated with Norvasc. Hypogonadism in male 11/14/2016    Dr Danette Gunn felt Secondary - see endo consult notes in media    Hypothyroidism     previous h/o incresed TSH, then nl off meds    IFG (impaired fasting glucose)     Lung granuloma ([address]     1 cm calcified granuloma R perihilar,  CT lung OK 2/18    Migraine headache     remote history of    Obesity     Post laminectomy syndrome     Following surgery [facility]  09/12. Epidurals were also done March 2012 and July 2012 by Dr. Leslie Reid.   MRI 11/16    Tobacco abuse        Past Surgical History:   Procedure Laterality Date    COLONOSCOPY  05/19/11    LUMBAR SPINE SURGERY  09/12    Lumbosacral spine procedure,  Calderon Jacome. OTHER SURGICAL HISTORY  1980    Procedure for hypospadias. OTHER SURGICAL HISTORY  03/22/04    Ureterocutaneous fistula repair. OTHER SURGICAL HISTORY  08/26/05    Bimalleolar fracture repair with open reduction and internal fixation. OTHER SURGICAL HISTORY  07/17/12, 3/20/12    Interlaminar epidural steroid injection. OTHER SURGICAL HISTORY Bilateral 7/17/15, 2/20/15, 11/2014, 8/1/14, 4/24/14    L5 TFE    OTHER SURGICAL HISTORY Bilateral 1/08/16    L5 TFE    OTHER SURGICAL HISTORY Bilateral 07/01/2016    bilateral TFE    UMBILICAL HERNIA REPAIR  02/18/2016    with mesh     UPPER GASTROINTESTINAL ENDOSCOPY  05/19/11       Current Outpatient Medications on File Prior to Visit   Medication Sig Dispense Refill    atorvastatin (LIPITOR) 10 MG tablet Take 1 tablet by mouth daily 90 tablet 3    esomeprazole (NEXIUM) 20 MG delayed release capsule Take 1 capsule by mouth daily 90 capsule 3    testosterone cypionate (DEPOTESTOTERONE CYPIONATE) 200 MG/ML injection Inject 1 mL into the muscle every 14 days for 90 days. Max Daily Amount: 200 mg 12 mL 1    SYRINGE-NEEDLE, DISP, 3 ML 25G X 1\" 3 ML MISC every 14 days for Testosterone injections 6 each 3    tadalafil (CIALIS) 20 MG tablet Take 1 tablet by mouth as needed for Erectile Dysfunction 60 tablet 3    lisinopril-hydroCHLOROthiazide (PRINZIDE;ZESTORETIC) 20-25 MG per tablet TAKE 1 TABLET DAILY 90 tablet 3    diclofenac sodium (VOLTAREN) 1 % GEL Apply 2 g topically 4 times daily as needed for Pain      oxyCODONE-acetaminophen (PERCOCET)  MG per tablet Take 1 tablet by mouth every 6 hours as needed for Pain. tiZANidine (ZANAFLEX) 4 MG tablet Take 1 tablet by mouth 3 times daily 20 tablet 0    Tens Unit MISC by Does not apply route. Pt needs eval for TENS unit replacement. The one he had ( with the back brace) was stolen recently.  1 each 0     Current Facility-Administered Medications on File Prior to Visit   Medication Dose Route Frequency Provider Last Rate Last Admin    testosterone cypionate (DEPOTESTOTERONE CYPIONATE) injection 200 mg  200 mg IntraMUSCular Q21 Days Barbara Olson MD   200 mg at 11/18/14 9996       Social History     Socioeconomic History    Marital status:      Spouse name: Not on file    Number of children: 1    Years of education: Not on file    Highest education level: Not on file   Occupational History     Employer: [employer]   Tobacco Use    Smoking status: Every Day     Packs/day: 0.50     Years: 5.00     Pack years: 2.50     Types: Cigarettes    Smokeless tobacco: Never   Vaping Use    Vaping Use: Never used   Substance and Sexual Activity    Alcohol use: Yes     Alcohol/week: 0.0 standard drinks     Comment: Denies current use, 8/15/17. occasionally    Drug use: Yes     Types: Marijuana Garon Patton)    Sexual activity: Yes     Partners: Female   Other Topics Concern    Not on file   Social History Narrative    Not on file     Social Determinants of Health     Financial Resource Strain: Low Risk     Difficulty of Paying Living Expenses: Not very hard   Food Insecurity: No Food Insecurity    Worried About Running Out of Food in the Last Year: Never true    Ran Out of Food in the Last Year: Never true   Transportation Needs: Unknown    Lack of Transportation (Medical): Not on file    Lack of Transportation (Non-Medical): No   Physical Activity: Not on file   Stress: Not on file   Social Connections: Not on file   Intimate Partner Violence: Not on file   Housing Stability: Unknown    Unable to Pay for Housing in the Last Year: Not on file    Number of Places Lived in the Last Year: Not on file    Unstable Housing in the Last Year: No       Review of Systems   Constitutional:  Positive for activity change. Negative for appetite change, chills, fatigue, fever and unexpected weight change.    HENT:  Negative for congestion, dental problem, ear discharge, ear pain, facial swelling, hearing loss, postnasal drip, rhinorrhea, sinus pressure, sore throat and trouble swallowing. Eyes:  Negative for pain and visual disturbance. Respiratory:  Negative for cough, chest tightness, shortness of breath and wheezing. Cardiovascular:  Negative for chest pain, palpitations and leg swelling. Gastrointestinal:  Negative for abdominal pain, blood in stool, constipation, diarrhea, nausea and vomiting. Endocrine: Negative for cold intolerance, heat intolerance and polyuria. Genitourinary:  Negative for difficulty urinating. Musculoskeletal:  Positive for arthralgias. Negative for gait problem, myalgias, neck pain and neck stiffness. Skin:  Negative for color change, rash and wound. Neurological:  Negative for dizziness, tremors, seizures, weakness, light-headedness, numbness and headaches. Psychiatric/Behavioral:  Negative for confusion and hallucinations. The patient is not nervous/anxious. Physical Exam  Vitals and nursing note reviewed. Constitutional:       General: He is not in acute distress. Appearance: He is well-developed. He is not diaphoretic. HENT:      Head: Normocephalic and atraumatic. Right Ear: External ear normal.      Left Ear: External ear normal.   Eyes:      General: Lids are normal.         Right eye: No discharge. Left eye: No discharge. Conjunctiva/sclera: Conjunctivae normal.      Pupils: Pupils are equal, round, and reactive to light. Neck:      Trachea: No tracheal deviation. Cardiovascular:      Rate and Rhythm: Normal rate and regular rhythm. Heart sounds: Normal heart sounds. No murmur heard. No friction rub. No gallop. Pulmonary:      Effort: Pulmonary effort is normal. No accessory muscle usage or respiratory distress. Breath sounds: Normal breath sounds. No stridor. No wheezing, rhonchi or rales. Abdominal:      General: Bowel sounds are normal. There is no distension. Palpations: Abdomen is soft. Abdomen is not rigid. Tenderness: There is no abdominal tenderness. Musculoskeletal:      Cervical back: Normal range of motion and neck supple. No edema or erythema. Left hip: Tenderness and bony tenderness present. No deformity, lacerations or crepitus. Decreased range of motion. Decreased strength. Right lower leg: No edema. Left lower leg: No deformity. No edema. Skin:     General: Skin is warm and dry. Capillary Refill: Capillary refill takes less than 2 seconds. Coloration: Skin is not jaundiced or pale. Findings: No erythema or rash. Neurological:      Mental Status: He is alert and oriented to person, place, and time. Cranial Nerves: No cranial nerve deficit. Sensory: No sensory deficit. Coordination: Coordination normal.   Psychiatric:         Behavior: Behavior normal.         Thought Content: Thought content normal.         Judgment: Judgment normal.     Vitals:    03/13/23 1424 03/13/23 1426   BP: (!) 138/92 (!) 138/92   Site: Right Upper Arm Right Upper Arm   Position: Sitting Sitting   Cuff Size: Large Adult Large Adult   Pulse: 88    Weight: 294 lb 12.8 oz (133.7 kg)    Height: 6' (1.829 m)        Assessment:  1. Left hip pain   External Referral To Orthopedic Surgery    2. Severe obesity (BMI 35.0-39. 9) with comorbidity ([address]  3. Post laminectomy syndrome  Pain well controlled with current pain regimen, will get him set back up with orthopedics for questionable injection versus surgical repair at orthopedics discretion. Note written to remain off for the next 5 days      Plan:  As noted above. Follow up for routine visit. Call sooner with concerns prior.     Electronically signed by ASHA Pimentel CNP on 3/13/2023 at 3:56 PM

## 2023-04-04 DIAGNOSIS — N52.9 ERECTILE DYSFUNCTION, UNSPECIFIED ERECTILE DYSFUNCTION TYPE: ICD-10-CM

## 2023-04-04 RX ORDER — TADALAFIL 20 MG/1
TABLET ORAL
Qty: 36 TABLET | Refills: 5 | Status: SHIPPED | OUTPATIENT
Start: 2023-04-04

## 2023-04-04 NOTE — TELEPHONE ENCOUNTER
Payal Ferreira called requesting a refill of the below medication which has been pended for you:     Requested Prescriptions     Pending Prescriptions Disp Refills    tadalafil (CIALIS) 20 MG tablet [Pharmacy Med Name: TADALAFIL TAB 20MG(C)] 36 tablet 3     Sig: TAKE 1 TABLET AS NEEDED FORERECTILE DYSFUNCTION       Last Appointment Date: 12/22/2022  Next Appointment Date: 4/11/2023    No Known Allergies

## 2023-04-17 RX ORDER — LISINOPRIL AND HYDROCHLOROTHIAZIDE 25; 20 MG/1; MG/1
TABLET ORAL
Qty: 90 TABLET | Refills: 3 | Status: SHIPPED | OUTPATIENT
Start: 2023-04-17

## 2023-04-17 NOTE — TELEPHONE ENCOUNTER
Bernarda Else called requesting a refill of the below medication which has been pended for you:     Requested Prescriptions     Pending Prescriptions Disp Refills    lisinopril-hydroCHLOROthiazide (PRINZIDE;ZESTORETIC) 20-25 MG per tablet [Pharmacy Med Name: LISINOP/HCTZ TAB 20-25MG] 90 tablet 3     Sig: TAKE 1 TABLET DAILY       Last Appointment Date: 4/11/2023  Next Appointment Date: 7/3/2023    No Known Allergies

## 2023-04-20 ENCOUNTER — OFFICE VISIT (OUTPATIENT)
Dept: CARDIOLOGY | Age: 48
End: 2023-04-20
Payer: COMMERCIAL

## 2023-04-20 VITALS
DIASTOLIC BLOOD PRESSURE: 98 MMHG | BODY MASS INDEX: 41.12 KG/M2 | SYSTOLIC BLOOD PRESSURE: 144 MMHG | HEART RATE: 103 BPM | HEIGHT: 72 IN | OXYGEN SATURATION: 98 % | WEIGHT: 303.6 LBS

## 2023-04-20 DIAGNOSIS — I10 PRIMARY HYPERTENSION: Primary | ICD-10-CM

## 2023-04-20 PROCEDURE — 3080F DIAST BP >= 90 MM HG: CPT | Performed by: INTERNAL MEDICINE

## 2023-04-20 PROCEDURE — 99204 OFFICE O/P NEW MOD 45 MIN: CPT | Performed by: INTERNAL MEDICINE

## 2023-04-20 PROCEDURE — 93000 ELECTROCARDIOGRAM COMPLETE: CPT | Performed by: INTERNAL MEDICINE

## 2023-04-20 PROCEDURE — 3077F SYST BP >= 140 MM HG: CPT | Performed by: INTERNAL MEDICINE

## 2023-04-20 RX ORDER — METOPROLOL SUCCINATE 25 MG/1
25 TABLET, EXTENDED RELEASE ORAL DAILY
Qty: 30 TABLET | Refills: 3 | Status: SHIPPED | OUTPATIENT
Start: 2023-04-20

## 2023-04-20 RX ORDER — OMEPRAZOLE 20 MG/1
20 CAPSULE, DELAYED RELEASE ORAL PRN
COMMUNITY
Start: 2023-03-15

## 2023-04-20 RX ORDER — NAPROXEN 500 MG/1
500 TABLET ORAL 2 TIMES DAILY
COMMUNITY
Start: 2023-03-15

## 2023-04-20 RX ORDER — TADALAFIL 10 MG/1
10 TABLET ORAL
COMMUNITY
Start: 2019-10-23 | End: 2023-04-20

## 2023-04-20 RX ORDER — TIZANIDINE HYDROCHLORIDE 4 MG/1
CAPSULE, GELATIN COATED ORAL
COMMUNITY
Start: 2023-03-29 | End: 2023-04-20

## 2023-04-20 RX ORDER — PREDNISONE 1 MG/1
TABLET ORAL
COMMUNITY
Start: 2023-03-15

## 2023-04-20 NOTE — PROGRESS NOTES
Cardiology Consultation/Follow Up. [facility]    CC: Patient is here to establish cardiac care/preop risk stratification. DEMARCO Paiz Means is with no previous cardiac history, here for initial evaluation and preoperative stratification prior to left hip surgery. He was found to be tachycardic on last evaluation which led to this referral.  He denies any recent chest pain, dyspnea, orthopnea, lower extremity edema or palpitations. Past Medical:  Past Medical History:   Diagnosis Date    Abnormal MRI of the head 02/2015    Yaima Grove, rosemary eval incidental finding refered to [facility] rec 1 yr f/u MR    ED (erectile dysfunction)     Esophageal ulcer     EGD, Dr. Lolita Wheeler, with esophageal ulcer and some inflammation on biopsy, 05/11. Fatty liver     incidental finding on CT 2/18    GERD (gastroesophageal reflux disease)     History of stress test     11/16  10.1 mets negative images    Hyperlipidemia     Hypertension     Previously treated with Norvasc. Hypogonadism in male 11/14/2016    Dr Yaima Grove felt Secondary - see endo consult notes in media    Hypothyroidism     previous h/o incresed TSH, then nl off meds    IFG (impaired fasting glucose)     Lung granuloma ([facility] [address]     1 cm calcified granuloma R perihilar,  CT lung OK 2/18    Migraine headache     remote history of    Obesity     Post laminectomy syndrome     Following surgery [facility]  09/12. Epidurals were also done March 2012 and July 2012 by Dr. Gilson Oneill. MRI 11/16    Tobacco abuse        Past Surgical:  Past Surgical History:   Procedure Laterality Date    COLONOSCOPY  05/19/11    LUMBAR SPINE SURGERY  09/12    Lumbosacral spine procedure, Dr. Rivas Caballero. OTHER SURGICAL HISTORY  1980    Procedure for hypospadias. OTHER SURGICAL HISTORY  03/22/04    Ureterocutaneous fistula repair. OTHER SURGICAL HISTORY  08/26/05    Bimalleolar fracture repair with open reduction and internal fixation.     OTHER SURGICAL HISTORY  07/17/12,

## 2023-04-24 RX ORDER — OMEPRAZOLE 20 MG/1
CAPSULE, DELAYED RELEASE ORAL
Qty: 90 CAPSULE | Refills: 3 | Status: SHIPPED | OUTPATIENT
Start: 2023-04-24

## 2023-04-24 NOTE — TELEPHONE ENCOUNTER
Luc Mccann called requesting a refill of the below medication which has been pended for you:     Requested Prescriptions     Pending Prescriptions Disp Refills    omeprazole (PRILOSEC) 20 MG delayed release capsule [Pharmacy Med Name: OMEPRAZOL RX CAP 20MG] 90 capsule 3     Sig: TAKE 1 CAPSULE EVERY       MORNING BEFORE BREAKFAST       Last Appointment Date: 4/11/2023  Next Appointment Date: 7/3/2023    No Known Allergies

## 2023-07-03 ENCOUNTER — OFFICE VISIT (OUTPATIENT)
Dept: INTERNAL MEDICINE | Age: 48
End: 2023-07-03
Payer: COMMERCIAL

## 2023-07-03 VITALS
OXYGEN SATURATION: 94 % | HEART RATE: 99 BPM | BODY MASS INDEX: 41.72 KG/M2 | DIASTOLIC BLOOD PRESSURE: 78 MMHG | WEIGHT: 308 LBS | RESPIRATION RATE: 16 BRPM | HEIGHT: 72 IN | SYSTOLIC BLOOD PRESSURE: 130 MMHG

## 2023-07-03 DIAGNOSIS — E03.9 HYPOTHYROIDISM (ACQUIRED): ICD-10-CM

## 2023-07-03 DIAGNOSIS — I10 PRIMARY HYPERTENSION: ICD-10-CM

## 2023-07-03 DIAGNOSIS — D50.9 IRON DEFICIENCY ANEMIA, UNSPECIFIED IRON DEFICIENCY ANEMIA TYPE: ICD-10-CM

## 2023-07-03 DIAGNOSIS — Z00.00 GENERAL MEDICAL EXAM: Primary | ICD-10-CM

## 2023-07-03 DIAGNOSIS — E78.49 OTHER HYPERLIPIDEMIA: ICD-10-CM

## 2023-07-03 DIAGNOSIS — R73.01 IFG (IMPAIRED FASTING GLUCOSE): ICD-10-CM

## 2023-07-03 PROCEDURE — 99396 PREV VISIT EST AGE 40-64: CPT | Performed by: INTERNAL MEDICINE

## 2023-07-03 PROCEDURE — 3078F DIAST BP <80 MM HG: CPT | Performed by: INTERNAL MEDICINE

## 2023-07-03 PROCEDURE — 3075F SYST BP GE 130 - 139MM HG: CPT | Performed by: INTERNAL MEDICINE

## 2023-07-03 ASSESSMENT — PATIENT HEALTH QUESTIONNAIRE - PHQ9
SUM OF ALL RESPONSES TO PHQ QUESTIONS 1-9: 0
SUM OF ALL RESPONSES TO PHQ9 QUESTIONS 1 & 2: 0
SUM OF ALL RESPONSES TO PHQ QUESTIONS 1-9: 0
2. FEELING DOWN, DEPRESSED OR HOPELESS: 0
1. LITTLE INTEREST OR PLEASURE IN DOING THINGS: 0

## 2023-07-03 ASSESSMENT — ENCOUNTER SYMPTOMS
VOMITING: 0
SHORTNESS OF BREATH: 0
NAUSEA: 0
DIARRHEA: 0
EYE PAIN: 0
CONSTIPATION: 0
ABDOMINAL PAIN: 0
COUGH: 0
BACK PAIN: 0
BLOOD IN STOOL: 0

## 2023-07-03 NOTE — PROGRESS NOTES
[address] [address]  Dept: [phone]  Dept Fax: [phone]  Loc: [phone]     Mirta Melendrez is a 41 y.o. male who presents today for his medical conditions/complaintsas noted below. Mirta Melendrez is c/o of   Chief Complaint   Patient presents with    Medicare AWV     6 month    Hypertension    Hyperlipidemia         HPI:     Hypertension  This is a chronic problem. The current episode started more than 1 year ago. The problem has been waxing and waning since onset. The problem is controlled. Pertinent negatives include no chest pain, headaches, neck pain, palpitations or shortness of breath. Hyperlipidemia  This is a chronic problem. The current episode started more than 1 year ago. The problem is controlled. Recent lipid tests were reviewed and are variable. Pertinent negatives include no chest pain or shortness of breath. Other  This is a recurrent (3-General medical exam) problem. The current episode started today. The problem occurs intermittently. The problem has been waxing and waning. Pertinent negatives include no abdominal pain, arthralgias, chest pain, chills, coughing, fever, headaches, nausea, neck pain, numbness, rash, vomiting or weakness.      Hemoglobin A1C (%)   Date Value   02/08/2023 6.1 (H)   06/23/2020 6.2 (H)   03/19/2019 6.2 (H)            No results found for: LABMICR  LDL Cholesterol (mg/dL)   Date Value   02/08/2023 141 (H)   12/13/2019 134 (H)   03/19/2019 153 (H)         AST (U/L)   Date Value   02/08/2023 13     ALT (U/L)   Date Value   02/08/2023 13     BUN (mg/dL)   Date Value   02/08/2023 11     BP Readings from Last 3 Encounters:   07/03/23 130/78   04/20/23 (!) 144/98   04/11/23 116/88              Past Medical History:   Diagnosis Date    Abnormal MRI of the head 02/2015    Andi Bloodgood, endo eval incidental finding refered to [facility] rec 1 yr f/u

## 2023-07-10 ENCOUNTER — HOSPITAL ENCOUNTER (OUTPATIENT)
Age: 48
Discharge: HOME | End: 2023-07-10
Payer: COMMERCIAL

## 2023-07-10 DIAGNOSIS — R73.01 IFG (IMPAIRED FASTING GLUCOSE): ICD-10-CM

## 2023-07-10 DIAGNOSIS — D50.9 IRON DEFICIENCY ANEMIA, UNSPECIFIED IRON DEFICIENCY ANEMIA TYPE: ICD-10-CM

## 2023-07-10 LAB
HGB BLD-MCNC: 13.3 G/DL (ref 13–17)
IRON SATN MFR SERPL: 29 % (ref 20–55)
IRON SERPL-MCNC: 94 UG/DL (ref 59–158)
TIBC SERPL-MCNC: 326 UG/DL (ref 250–450)
UNSATURATED IRON BINDING CAPACITY: 232 UG/DL (ref 112–347)

## 2023-07-10 PROCEDURE — 83036 HEMOGLOBIN GLYCOSYLATED A1C: CPT

## 2023-07-10 PROCEDURE — 85018 HEMOGLOBIN: CPT

## 2023-07-10 PROCEDURE — 36415 COLL VENOUS BLD VENIPUNCTURE: CPT

## 2023-07-10 PROCEDURE — 83540 ASSAY OF IRON: CPT

## 2023-07-10 PROCEDURE — 83550 IRON BINDING TEST: CPT

## 2023-07-11 LAB
EST. AVERAGE GLUCOSE BLD GHB EST-MCNC: 120 MG/DL
HBA1C MFR BLD: 5.8 % (ref 4–6)

## 2023-08-07 ENCOUNTER — HOSPITAL ENCOUNTER (OUTPATIENT)
Dept: MRI IMAGING | Age: 48
Discharge: HOME | End: 2023-08-09
Payer: COMMERCIAL

## 2023-08-07 DIAGNOSIS — M25.562 LEFT KNEE PAIN, UNSPECIFIED CHRONICITY: ICD-10-CM

## 2023-08-07 PROCEDURE — 73721 MRI JNT OF LWR EXTRE W/O DYE: CPT

## 2023-09-11 RX ORDER — METOPROLOL SUCCINATE 25 MG/1
25 TABLET, EXTENDED RELEASE ORAL DAILY
Qty: 90 TABLET | Refills: 3 | Status: SHIPPED | OUTPATIENT
Start: 2023-09-11

## 2023-09-11 NOTE — TELEPHONE ENCOUNTER
Last Appt:  4/20/2023  Next Appt:   Visit date not found  Med verified in [address] [place] [address]

## 2023-12-11 RX ORDER — OMEPRAZOLE 20 MG/1
20 CAPSULE, DELAYED RELEASE ORAL
Qty: 90 CAPSULE | Refills: 3 | Status: SHIPPED | OUTPATIENT
Start: 2023-12-11

## 2023-12-11 NOTE — TELEPHONE ENCOUNTER
----- Message from Roxana Argueta sent at 12/11/2023  9:05 AM EST -----  Subject: Refill Request    QUESTIONS  Name of Medication? omeprazole (PRILOSEC) 20 MG delayed release capsule  Patient-reported dosage and instructions? as needed  How many days do you have left? 0  Preferred Pharmacy? [facility] [id number]  Pharmacy phone number (if available)? [phone]  ---------------------------------------------------------------------------  --------------  CALL BACK INFO  What is the best way for the office to contact you? OK to leave message on   voicemail  Preferred Call Back Phone Number? [phone]  ---------------------------------------------------------------------------  --------------  SCRIPT ANSWERS  Relationship to Patient?  Self

## 2024-01-09 ENCOUNTER — OFFICE VISIT (OUTPATIENT)
Dept: INTERNAL MEDICINE | Age: 49
End: 2024-01-09
Payer: COMMERCIAL

## 2024-01-09 ENCOUNTER — TELEPHONE (OUTPATIENT)
Dept: SURGERY | Age: 49
End: 2024-01-09

## 2024-01-09 VITALS
HEART RATE: 95 BPM | RESPIRATION RATE: 16 BRPM | SYSTOLIC BLOOD PRESSURE: 118 MMHG | HEIGHT: 72 IN | BODY MASS INDEX: 41.99 KG/M2 | WEIGHT: 310 LBS | OXYGEN SATURATION: 95 % | DIASTOLIC BLOOD PRESSURE: 76 MMHG

## 2024-01-09 DIAGNOSIS — R73.01 IFG (IMPAIRED FASTING GLUCOSE): ICD-10-CM

## 2024-01-09 DIAGNOSIS — I10 PRIMARY HYPERTENSION: Primary | ICD-10-CM

## 2024-01-09 DIAGNOSIS — K21.9 GASTROESOPHAGEAL REFLUX DISEASE WITHOUT ESOPHAGITIS: ICD-10-CM

## 2024-01-09 DIAGNOSIS — E78.49 OTHER HYPERLIPIDEMIA: ICD-10-CM

## 2024-01-09 DIAGNOSIS — E03.9 ACQUIRED HYPOTHYROIDISM: ICD-10-CM

## 2024-01-09 DIAGNOSIS — Z12.11 SCREEN FOR COLON CANCER: ICD-10-CM

## 2024-01-09 DIAGNOSIS — E66.9 OBESITY, UNSPECIFIED CLASSIFICATION, UNSPECIFIED OBESITY TYPE, UNSPECIFIED WHETHER SERIOUS COMORBIDITY PRESENT: ICD-10-CM

## 2024-01-09 PROCEDURE — 3078F DIAST BP <80 MM HG: CPT | Performed by: INTERNAL MEDICINE

## 2024-01-09 PROCEDURE — 99214 OFFICE O/P EST MOD 30 MIN: CPT | Performed by: INTERNAL MEDICINE

## 2024-01-09 PROCEDURE — 3074F SYST BP LT 130 MM HG: CPT | Performed by: INTERNAL MEDICINE

## 2024-01-09 RX ORDER — PHENTERMINE HYDROCHLORIDE 37.5 MG/1
37.5 TABLET ORAL
Qty: 30 TABLET | Refills: 0 | Status: SHIPPED | OUTPATIENT
Start: 2024-01-09 | End: 2024-02-08

## 2024-01-09 RX ORDER — OMEPRAZOLE 20 MG/1
20 CAPSULE, DELAYED RELEASE ORAL
Qty: 90 CAPSULE | Refills: 3 | Status: SHIPPED | OUTPATIENT
Start: 2024-01-09

## 2024-01-09 ASSESSMENT — ENCOUNTER SYMPTOMS
DIARRHEA: 0
BLOOD IN STOOL: 0
BACK PAIN: 0
COUGH: 0
VOMITING: 0
CONSTIPATION: 0
EYE PAIN: 0
ABDOMINAL PAIN: 0
SHORTNESS OF BREATH: 0
NAUSEA: 0

## 2024-01-09 ASSESSMENT — PATIENT HEALTH QUESTIONNAIRE - PHQ9
SUM OF ALL RESPONSES TO PHQ9 QUESTIONS 1 & 2: 0
SUM OF ALL RESPONSES TO PHQ QUESTIONS 1-9: 0
2. FEELING DOWN, DEPRESSED OR HOPELESS: 0
SUM OF ALL RESPONSES TO PHQ QUESTIONS 1-9: 0
1. LITTLE INTEREST OR PLEASURE IN DOING THINGS: 0

## 2024-01-09 NOTE — PROGRESS NOTES
[facility]  [facility]PART[facility] OF [facility]  [address] [facility] [address]  Dept: [phone]  Dept Fax: [phone]  Loc: [phone]     José Miguel Villa is a 48 y.o. male who presents today for his medical conditions/complaintsas noted below.  José Miguel Villa is c/o of   Chief Complaint   Patient presents with    Hypertension     6 month    Hyperlipidemia    Hypothyroidism         HPI:     Hypertension  This is a chronic problem. The current episode started more than 1 year ago. The problem has been waxing and waning since onset. The problem is controlled. Pertinent negatives include no chest pain, headaches, neck pain, palpitations or shortness of breath.   Hyperlipidemia  This is a chronic problem. The current episode started more than 1 year ago. The problem is controlled. Recent lipid tests were reviewed and are variable. Pertinent negatives include no chest pain or shortness of breath.   Other  This is a chronic (3-hypothyroidism) problem. The current episode started more than 1 year ago. The problem occurs constantly. The problem has been waxing and waning. Pertinent negatives include no abdominal pain, arthralgias, chest pain, chills, coughing, fever, headaches, nausea, neck pain, numbness, rash, vomiting or weakness.       Hemoglobin A1C (%)   Date Value   07/10/2023 5.8   02/08/2023 6.1 (H)   06/23/2020 6.2 (H)            No components found for: \"LABMICR\"  LDL Cholesterol (mg/dL)   Date Value   02/08/2023 141 (H)   12/13/2019 134 (H)   03/19/2019 153 (H)         AST (U/L)   Date Value   02/08/2023 13     ALT (U/L)   Date Value   02/08/2023 13     BUN (mg/dL)   Date Value   02/08/2023 11     BP Readings from Last 3 Encounters:   01/09/24 118/76   07/03/23 130/78   04/20/23 (!) 144/98              Past Medical History:   Diagnosis Date    Abnormal MRI of the head 02/2015    rosemary Diaz incidental finding refered to [facility]

## 2024-01-15 DIAGNOSIS — E66.9 OBESITY, UNSPECIFIED CLASSIFICATION, UNSPECIFIED OBESITY TYPE, UNSPECIFIED WHETHER SERIOUS COMORBIDITY PRESENT: ICD-10-CM

## 2024-01-15 DIAGNOSIS — K21.9 GASTROESOPHAGEAL REFLUX DISEASE WITHOUT ESOPHAGITIS: ICD-10-CM

## 2024-01-15 RX ORDER — OMEPRAZOLE 20 MG/1
20 CAPSULE, DELAYED RELEASE ORAL
Qty: 90 CAPSULE | Refills: 3 | Status: SHIPPED | OUTPATIENT
Start: 2024-01-15

## 2024-01-15 RX ORDER — PHENTERMINE HYDROCHLORIDE 37.5 MG/1
37.5 TABLET ORAL
Qty: 30 TABLET | Refills: 2 | Status: SHIPPED | OUTPATIENT
Start: 2024-01-15 | End: 2024-01-18 | Stop reason: SDUPTHER

## 2024-01-15 NOTE — TELEPHONE ENCOUNTER
José Miguel called requesting a refill of the below medication which has been pended for you:     Requested Prescriptions     Pending Prescriptions Disp Refills    omeprazole (PRILOSEC) 20 MG delayed release capsule 90 capsule 3     Sig: Take 1 capsule by mouth every morning (before breakfast)    phentermine (ADIPEX-P) 37.5 MG tablet 30 tablet 0     Sig: Take 1 tablet by mouth every morning (before breakfast) for 30 days. Max Daily Amount: 37.5 mg       Last Appointment Date: 1/9/2024  Next Appointment Date: 7/18/2024    No Known Allergies   
Less than monthly

## 2024-01-17 ENCOUNTER — PATIENT MESSAGE (OUTPATIENT)
Dept: INTERNAL MEDICINE | Age: 49
End: 2024-01-17

## 2024-01-17 DIAGNOSIS — E66.9 OBESITY, UNSPECIFIED CLASSIFICATION, UNSPECIFIED OBESITY TYPE, UNSPECIFIED WHETHER SERIOUS COMORBIDITY PRESENT: ICD-10-CM

## 2024-01-18 RX ORDER — PHENTERMINE HYDROCHLORIDE 37.5 MG/1
37.5 TABLET ORAL
Qty: 30 TABLET | Refills: 2 | Status: SHIPPED | OUTPATIENT
Start: 2024-01-18 | End: 2024-04-17

## 2024-01-18 RX ORDER — PHENTERMINE HYDROCHLORIDE 37.5 MG/1
37.5 TABLET ORAL
Qty: 30 TABLET | Refills: 2 | Status: CANCELLED | OUTPATIENT
Start: 2024-01-18 | End: 2024-04-17

## 2024-01-18 NOTE — TELEPHONE ENCOUNTER
From: José Miguel Villa  To: Dr. Donnie Camacho  Sent: 1/17/2024 10:08 PM EST  Subject: The adipex    My insurance doesn't cover this medicine. Could you send to [facility] . It's 8.76 I'll pay out of pocket for it. It was previously sent to [facility].    Thank you

## 2024-04-04 DIAGNOSIS — N52.9 ERECTILE DYSFUNCTION, UNSPECIFIED ERECTILE DYSFUNCTION TYPE: ICD-10-CM

## 2024-04-04 RX ORDER — TADALAFIL 20 MG/1
TABLET ORAL
Qty: 36 TABLET | Refills: 5 | Status: SHIPPED | OUTPATIENT
Start: 2024-04-04

## 2024-04-18 ENCOUNTER — OFFICE VISIT (OUTPATIENT)
Dept: CARDIOLOGY | Age: 49
End: 2024-04-18
Payer: COMMERCIAL

## 2024-04-18 VITALS
SYSTOLIC BLOOD PRESSURE: 114 MMHG | HEIGHT: 72 IN | WEIGHT: 294 LBS | BODY MASS INDEX: 39.82 KG/M2 | DIASTOLIC BLOOD PRESSURE: 70 MMHG | HEART RATE: 102 BPM

## 2024-04-18 DIAGNOSIS — I10 ESSENTIAL HYPERTENSION: ICD-10-CM

## 2024-04-18 DIAGNOSIS — I10 PRIMARY HYPERTENSION: Primary | ICD-10-CM

## 2024-04-18 DIAGNOSIS — E78.49 OTHER HYPERLIPIDEMIA: ICD-10-CM

## 2024-04-18 PROCEDURE — 99214 OFFICE O/P EST MOD 30 MIN: CPT | Performed by: INTERNAL MEDICINE

## 2024-04-18 PROCEDURE — 3074F SYST BP LT 130 MM HG: CPT | Performed by: INTERNAL MEDICINE

## 2024-04-18 PROCEDURE — 3078F DIAST BP <80 MM HG: CPT | Performed by: INTERNAL MEDICINE

## 2024-04-18 PROCEDURE — 93000 ELECTROCARDIOGRAM COMPLETE: CPT | Performed by: INTERNAL MEDICINE

## 2024-04-18 RX ORDER — PREGABALIN 75 MG/1
150 CAPSULE ORAL 2 TIMES DAILY
COMMUNITY
Start: 2024-03-27

## 2024-04-18 RX ORDER — ATORVASTATIN CALCIUM 20 MG/1
20 TABLET, FILM COATED ORAL DAILY
Qty: 90 TABLET | Refills: 3 | Status: SHIPPED | OUTPATIENT
Start: 2024-04-18

## 2024-04-18 RX ORDER — NALOXONE HYDROCHLORIDE 4 MG/.1ML
1 SPRAY NASAL PRN
COMMUNITY
Start: 2023-09-26 | End: 2024-09-25

## 2024-04-18 RX ORDER — OXYCODONE AND ACETAMINOPHEN 10; 325 MG/1; MG/1
1 TABLET ORAL EVERY 6 HOURS PRN
COMMUNITY
Start: 2024-03-26

## 2024-10-22 ENCOUNTER — TELEPHONE (OUTPATIENT)
Dept: INTERNAL MEDICINE | Age: 49
End: 2024-10-22

## 2024-10-22 NOTE — TELEPHONE ENCOUNTER
Patient's wife Amaya called in and stated that patient went and seen Eric today. She stated that Eladio is requesting 2 weeks off of work, but Eric did not have a doctor there today that was able to write him off and advised patient to call pcp. She is also going to have his employer fax paperwork to us to fill out.    Please advise.

## 2024-10-22 NOTE — TELEPHONE ENCOUNTER
Viri Leahy called in with [employer] stating that patient will be calling in for an off work note. Per viri patient had a crisis screening with them and was found to need psychiatry and therapy services. Per Viri we can reach her at [phone] with any questions.

## 2024-10-29 ENCOUNTER — TELEPHONE (OUTPATIENT)
Dept: INTERNAL MEDICINE | Age: 49
End: 2024-10-29

## 2024-10-29 NOTE — TELEPHONE ENCOUNTER
Writer received patients disability forms and FMLA forms via fax on 10/24/2024.    Writer did call patient today to find out if he had been seen yet @ [facility] since we have no documentation to fill out his forms as we have not seen him in the office for this issue.  Eladio stated he has not been seen yet by anyone and has his 1st appointment with them on November 14th @ 9am.  So writer scheduled patient with Dr Camacho on 10/31/24 @ 9:30am so we can move forward with filling out his forms.

## 2024-10-31 ENCOUNTER — OFFICE VISIT (OUTPATIENT)
Dept: INTERNAL MEDICINE | Age: 49
End: 2024-10-31

## 2024-10-31 VITALS
HEART RATE: 95 BPM | HEIGHT: 72 IN | WEIGHT: 305 LBS | DIASTOLIC BLOOD PRESSURE: 88 MMHG | SYSTOLIC BLOOD PRESSURE: 124 MMHG | BODY MASS INDEX: 41.31 KG/M2 | OXYGEN SATURATION: 95 % | RESPIRATION RATE: 16 BRPM

## 2024-10-31 DIAGNOSIS — D64.9 ANEMIA, UNSPECIFIED TYPE: ICD-10-CM

## 2024-10-31 DIAGNOSIS — E78.49 OTHER HYPERLIPIDEMIA: ICD-10-CM

## 2024-10-31 DIAGNOSIS — I10 PRIMARY HYPERTENSION: ICD-10-CM

## 2024-10-31 DIAGNOSIS — F41.9 ANXIETY: Primary | ICD-10-CM

## 2024-10-31 DIAGNOSIS — J01.00 ACUTE NON-RECURRENT MAXILLARY SINUSITIS: ICD-10-CM

## 2024-10-31 DIAGNOSIS — R73.01 IFG (IMPAIRED FASTING GLUCOSE): ICD-10-CM

## 2024-10-31 DIAGNOSIS — E03.9 HYPOTHYROIDISM (ACQUIRED): ICD-10-CM

## 2024-10-31 RX ORDER — LORAZEPAM 0.5 MG/1
0.5 TABLET ORAL EVERY 6 HOURS PRN
Qty: 120 TABLET | Refills: 2 | Status: SHIPPED | OUTPATIENT
Start: 2024-10-31 | End: 2025-01-29

## 2024-10-31 RX ORDER — AMOXICILLIN AND CLAVULANATE POTASSIUM 500; 125 MG/1; MG/1
1 TABLET, FILM COATED ORAL 3 TIMES DAILY
Qty: 30 TABLET | Refills: 0 | Status: SHIPPED | OUTPATIENT
Start: 2024-10-31 | End: 2024-11-10

## 2024-10-31 SDOH — ECONOMIC STABILITY: INCOME INSECURITY: HOW HARD IS IT FOR YOU TO PAY FOR THE VERY BASICS LIKE FOOD, HOUSING, MEDICAL CARE, AND HEATING?: NOT HARD AT ALL

## 2024-10-31 SDOH — ECONOMIC STABILITY: FOOD INSECURITY: WITHIN THE PAST 12 MONTHS, YOU WORRIED THAT YOUR FOOD WOULD RUN OUT BEFORE YOU GOT MONEY TO BUY MORE.: NEVER TRUE

## 2024-10-31 SDOH — ECONOMIC STABILITY: FOOD INSECURITY: WITHIN THE PAST 12 MONTHS, THE FOOD YOU BOUGHT JUST DIDN'T LAST AND YOU DIDN'T HAVE MONEY TO GET MORE.: NEVER TRUE

## 2024-10-31 ASSESSMENT — ENCOUNTER SYMPTOMS
COUGH: 0
SHORTNESS OF BREATH: 0

## 2024-10-31 NOTE — PROGRESS NOTES
[facility]  [facility] INTER[facility] MED A DEPARTMENT OF [facility]  [address] [facility] [address]  Dept: [phone]  Dept Fax: [phone]  Loc: [phone]     José Miguel Villa is a 49 y.o. male who presents today for his medical conditions/complaintsas noted below.  José Miguel Villa is c/o of   Chief Complaint   Patient presents with    Anxiety     Severe    Hypertension    Hyperlipidemia    Insomnia         HPI:     Anxiety  Presents for initial visit. Onset was 1 to 4 weeks ago. The problem has been waxing and waning. Patient reports no shortness of breath. Symptoms occur constantly.       Hypertension  This is a chronic problem. The current episode started more than 1 year ago. The problem has been waxing and waning since onset. The problem is controlled. Pertinent negatives include no shortness of breath.   Hyperlipidemia  This is a chronic problem. The current episode started more than 1 year ago. The problem is controlled. Recent lipid tests were reviewed and are variable. Pertinent negatives include no shortness of breath.       Hemoglobin A1C (%)   Date Value   07/10/2023 5.8   02/08/2023 6.1 (H)   06/23/2020 6.2 (H)            No components found for: \"LABMICR\"  No components found for: \"LDLCHOLESTEROL\", \"LDLCALC\"      AST (U/L)   Date Value   02/08/2023 13     ALT (U/L)   Date Value   02/08/2023 13     BUN (mg/dL)   Date Value   02/08/2023 11     BP Readings from Last 3 Encounters:   10/31/24 124/88   04/18/24 114/70   01/09/24 118/76              Past Medical History:   Diagnosis Date    Abnormal MRI of the head 02/2015    rosemary Diaz eval incidental finding refered to [facility] rec 1 yr f/u MR    ED (erectile dysfunction)     Esophageal ulcer     EGD, Dr. Hatfield, with esophageal ulcer and some inflammation on biopsy, 05/11.     Fatty liver     incidental finding on CT 2/18    GERD (gastroesophageal reflux disease)     History of stress test     11/16  10.1

## 2024-11-05 ENCOUNTER — TELEPHONE (OUTPATIENT)
Dept: INTERNAL MEDICINE | Age: 49
End: 2024-11-05

## 2024-11-05 NOTE — TELEPHONE ENCOUNTER
Fernando espinoza called and wanted to clarify what we needed I informed them we need anything that was done during his visit with them they will fax to our office.

## 2024-11-05 NOTE — TELEPHONE ENCOUNTER
I received all of his records from the date of 10/22/2024 and have faxed his Disability paper work to [employer].

## 2024-12-05 ENCOUNTER — OFFICE VISIT (OUTPATIENT)
Dept: INTERNAL MEDICINE | Age: 49
End: 2024-12-05
Payer: COMMERCIAL

## 2024-12-05 VITALS
RESPIRATION RATE: 16 BRPM | WEIGHT: 315 LBS | HEIGHT: 72 IN | HEART RATE: 95 BPM | SYSTOLIC BLOOD PRESSURE: 136 MMHG | OXYGEN SATURATION: 97 % | DIASTOLIC BLOOD PRESSURE: 86 MMHG | BODY MASS INDEX: 42.66 KG/M2

## 2024-12-05 DIAGNOSIS — E78.49 OTHER HYPERLIPIDEMIA: ICD-10-CM

## 2024-12-05 DIAGNOSIS — Z00.00 GENERAL MEDICAL EXAM: Primary | ICD-10-CM

## 2024-12-05 DIAGNOSIS — Z78.9 WEIGHT LOSS ADVISED: ICD-10-CM

## 2024-12-05 DIAGNOSIS — I10 PRIMARY HYPERTENSION: ICD-10-CM

## 2024-12-05 DIAGNOSIS — K21.9 GASTROESOPHAGEAL REFLUX DISEASE WITHOUT ESOPHAGITIS: ICD-10-CM

## 2024-12-05 DIAGNOSIS — R10.11 RIGHT UPPER QUADRANT PAIN: ICD-10-CM

## 2024-12-05 PROCEDURE — 99396 PREV VISIT EST AGE 40-64: CPT | Performed by: INTERNAL MEDICINE

## 2024-12-05 PROCEDURE — 3079F DIAST BP 80-89 MM HG: CPT | Performed by: INTERNAL MEDICINE

## 2024-12-05 PROCEDURE — 3075F SYST BP GE 130 - 139MM HG: CPT | Performed by: INTERNAL MEDICINE

## 2024-12-05 RX ORDER — PHENTERMINE HYDROCHLORIDE 37.5 MG/1
37.5 TABLET ORAL
Qty: 90 TABLET | Refills: 1 | Status: SHIPPED | OUTPATIENT
Start: 2024-12-05 | End: 2025-06-03

## 2024-12-05 RX ORDER — METOPROLOL SUCCINATE 25 MG/1
25 TABLET, EXTENDED RELEASE ORAL DAILY
Qty: 90 TABLET | Refills: 3 | Status: SHIPPED | OUTPATIENT
Start: 2024-12-05

## 2024-12-05 ASSESSMENT — ENCOUNTER SYMPTOMS
BACK PAIN: 0
NAUSEA: 0
EYE PAIN: 0
ABDOMINAL PAIN: 0
BLOOD IN STOOL: 0
SHORTNESS OF BREATH: 0
VOMITING: 0
DIARRHEA: 0
CONSTIPATION: 0
COUGH: 0

## 2024-12-05 NOTE — PROGRESS NOTES
Status:   Future     Standing Expiration Date:   12/5/2025     Order Specific Question:   Reason for exam:     Answer:   pain after eating     Order Specific Question:   Specify organ?     Answer:   GALLBLADDER     Orders Placed This Encounter   Medications    metoprolol succinate (TOPROL XL) 25 MG extended release tablet     Sig: Take 1 tablet by mouth daily     Dispense:  90 tablet     Refill:  3    omeprazole (PRILOSEC) 20 MG delayed release capsule     Sig: Take 1 capsule by mouth every morning (before breakfast)     Dispense:  90 capsule     Refill:  3    phentermine (ADIPEX-P) 37.5 MG tablet     Sig: Take 1 tablet by mouth every morning (before breakfast) for 180 days. Max Daily Amount: 37.5 mg     Dispense:  90 tablet     Refill:  1       Patientgiven educational materials - see patient instructions.  Discussed use, benefit,and side effects of prescribed medications.  All patient questions answered. Ptvoiced understanding. Reviewed health maintenance.  Instructed to continue currentmedications, diet and exercise.  Patient agreed with treatment plan. Follow up asdirected.     Electronically signed by Donnie Camacho MD on 12/5/2024 at 10:03 AM

## 2024-12-06 ENCOUNTER — HOSPITAL ENCOUNTER (OUTPATIENT)
Dept: ULTRASOUND IMAGING | Age: 49
Discharge: HOME | End: 2024-12-08
Attending: INTERNAL MEDICINE
Payer: COMMERCIAL

## 2024-12-06 ENCOUNTER — HOSPITAL ENCOUNTER (OUTPATIENT)
Age: 49
Discharge: HOME | End: 2024-12-06
Payer: COMMERCIAL

## 2024-12-06 DIAGNOSIS — D64.9 ANEMIA, UNSPECIFIED TYPE: ICD-10-CM

## 2024-12-06 DIAGNOSIS — E78.49 OTHER HYPERLIPIDEMIA: ICD-10-CM

## 2024-12-06 DIAGNOSIS — I10 PRIMARY HYPERTENSION: ICD-10-CM

## 2024-12-06 DIAGNOSIS — R73.01 IFG (IMPAIRED FASTING GLUCOSE): ICD-10-CM

## 2024-12-06 DIAGNOSIS — R10.11 RIGHT UPPER QUADRANT PAIN: ICD-10-CM

## 2024-12-06 DIAGNOSIS — E03.9 HYPOTHYROIDISM (ACQUIRED): ICD-10-CM

## 2024-12-06 LAB
ALBUMIN SERPL-MCNC: 4.5 G/DL (ref 3.5–5.2)
ALBUMIN/GLOB SERPL: 1.2 {RATIO} (ref 1–2.5)
ALP SERPL-CCNC: 99 U/L (ref 40–129)
ALT SERPL-CCNC: 27 U/L (ref 5–41)
ANION GAP SERPL CALCULATED.3IONS-SCNC: 8 MMOL/L (ref 9–17)
AST SERPL-CCNC: 24 U/L
BASOPHILS # BLD: 0.04 K/UL (ref 0–0.2)
BASOPHILS NFR BLD: 1 % (ref 0–2)
BILIRUB SERPL-MCNC: 0.4 MG/DL (ref 0.3–1.2)
BUN SERPL-MCNC: 13 MG/DL (ref 6–20)
BUN/CREAT SERPL: 19 (ref 9–20)
CALCIUM SERPL-MCNC: 9.4 MG/DL (ref 8.6–10.4)
CHLORIDE SERPL-SCNC: 96 MMOL/L (ref 98–107)
CHOLEST SERPL-MCNC: 240 MG/DL (ref 0–199)
CHOLESTEROL/HDL RATIO: 4.5
CO2 SERPL-SCNC: 30 MMOL/L (ref 20–31)
CREAT SERPL-MCNC: 0.7 MG/DL (ref 0.7–1.2)
EOSINOPHIL # BLD: 0.06 K/UL (ref 0–0.44)
EOSINOPHILS RELATIVE PERCENT: 1 % (ref 1–4)
ERYTHROCYTE [DISTWIDTH] IN BLOOD BY AUTOMATED COUNT: 13 % (ref 11.8–14.4)
EST. AVERAGE GLUCOSE BLD GHB EST-MCNC: 134 MG/DL
GFR, ESTIMATED: >90 ML/MIN/1.73M2
GLUCOSE SERPL-MCNC: 135 MG/DL (ref 70–99)
HBA1C MFR BLD: 6.3 % (ref 4–6)
HCT VFR BLD AUTO: 43.6 % (ref 40.7–50.3)
HDLC SERPL-MCNC: 53 MG/DL
HGB BLD-MCNC: 14.4 G/DL (ref 13–17)
IMM GRANULOCYTES # BLD AUTO: <0.03 K/UL (ref 0–0.3)
IMM GRANULOCYTES NFR BLD: 0 %
LDLC SERPL CALC-MCNC: 156 MG/DL (ref 0–100)
LYMPHOCYTES NFR BLD: 2.22 K/UL (ref 1.1–3.7)
LYMPHOCYTES RELATIVE PERCENT: 33 % (ref 24–43)
MCH RBC QN AUTO: 31.6 PG (ref 25.2–33.5)
MCHC RBC AUTO-ENTMCNC: 33 G/DL (ref 25.2–33.5)
MCV RBC AUTO: 95.6 FL (ref 82.6–102.9)
MONOCYTES NFR BLD: 0.6 K/UL (ref 0.1–1.2)
MONOCYTES NFR BLD: 9 % (ref 3–12)
NEUTROPHILS NFR BLD: 56 % (ref 36–65)
NEUTS SEG NFR BLD: 3.87 K/UL (ref 1.5–8.1)
NRBC BLD-RTO: 0 PER 100 WBC
PLATELET # BLD AUTO: 276 K/UL (ref 138–453)
PMV BLD AUTO: 10.4 FL (ref 8.1–13.5)
POTASSIUM SERPL-SCNC: 5.1 MMOL/L (ref 3.7–5.3)
PROT SERPL-MCNC: 8.4 G/DL (ref 6.4–8.3)
RBC # BLD AUTO: 4.56 M/UL (ref 4.21–5.77)
SODIUM SERPL-SCNC: 134 MMOL/L (ref 135–144)
T4 FREE SERPL-MCNC: 1.2 NG/DL (ref 0.9–1.7)
TRIGL SERPL-MCNC: 156 MG/DL (ref 0–149)
TSH SERPL DL<=0.05 MIU/L-ACNC: 0.37 UIU/ML (ref 0.3–5)
VLDLC SERPL CALC-MCNC: 31 MG/DL (ref 1–30)
WBC OTHER # BLD: 6.8 K/UL (ref 3.5–11.3)

## 2024-12-06 PROCEDURE — 36415 COLL VENOUS BLD VENIPUNCTURE: CPT

## 2024-12-06 PROCEDURE — 85025 COMPLETE CBC W/AUTO DIFF WBC: CPT

## 2024-12-06 PROCEDURE — 83036 HEMOGLOBIN GLYCOSYLATED A1C: CPT

## 2024-12-06 PROCEDURE — 80053 COMPREHEN METABOLIC PANEL: CPT

## 2024-12-06 PROCEDURE — 76705 ECHO EXAM OF ABDOMEN: CPT

## 2024-12-06 PROCEDURE — 84439 ASSAY OF FREE THYROXINE: CPT

## 2024-12-06 PROCEDURE — 84443 ASSAY THYROID STIM HORMONE: CPT

## 2024-12-06 PROCEDURE — 80061 LIPID PANEL: CPT

## 2024-12-09 DIAGNOSIS — E78.49 OTHER HYPERLIPIDEMIA: Primary | ICD-10-CM

## 2024-12-09 DIAGNOSIS — I10 PRIMARY HYPERTENSION: ICD-10-CM

## 2024-12-09 DIAGNOSIS — R73.01 IFG (IMPAIRED FASTING GLUCOSE): ICD-10-CM

## 2025-02-18 ENCOUNTER — TELEPHONE (OUTPATIENT)
Dept: PODIATRY | Age: 50
End: 2025-02-18

## 2025-02-18 NOTE — TELEPHONE ENCOUNTER
Patient called to schedule with Dr Cotton for what he states as plantar fasciitis.  Patient has seen Dr Cotton before 2019.  Appt given April 10.  Please call patient if he can be worked in sooner.  [phone]

## 2025-02-25 DIAGNOSIS — I10 ESSENTIAL HYPERTENSION: ICD-10-CM

## 2025-02-26 RX ORDER — ATORVASTATIN CALCIUM 20 MG/1
20 TABLET, FILM COATED ORAL DAILY
Qty: 90 TABLET | Refills: 3 | Status: SHIPPED | OUTPATIENT
Start: 2025-02-26

## 2025-03-10 ENCOUNTER — OFFICE VISIT (OUTPATIENT)
Dept: INTERNAL MEDICINE | Age: 50
End: 2025-03-10
Payer: COMMERCIAL

## 2025-03-10 VITALS
HEIGHT: 72 IN | SYSTOLIC BLOOD PRESSURE: 132 MMHG | OXYGEN SATURATION: 97 % | BODY MASS INDEX: 42.26 KG/M2 | RESPIRATION RATE: 16 BRPM | HEART RATE: 99 BPM | WEIGHT: 312 LBS | DIASTOLIC BLOOD PRESSURE: 88 MMHG

## 2025-03-10 DIAGNOSIS — I10 PRIMARY HYPERTENSION: Primary | ICD-10-CM

## 2025-03-10 DIAGNOSIS — R91.1 LUNG NODULE: ICD-10-CM

## 2025-03-10 DIAGNOSIS — E78.49 OTHER HYPERLIPIDEMIA: ICD-10-CM

## 2025-03-10 DIAGNOSIS — F41.9 ANXIETY: ICD-10-CM

## 2025-03-10 DIAGNOSIS — R73.01 IFG (IMPAIRED FASTING GLUCOSE): ICD-10-CM

## 2025-03-10 DIAGNOSIS — R05.3 CHRONIC COUGH: ICD-10-CM

## 2025-03-10 PROCEDURE — 3075F SYST BP GE 130 - 139MM HG: CPT | Performed by: INTERNAL MEDICINE

## 2025-03-10 PROCEDURE — 99214 OFFICE O/P EST MOD 30 MIN: CPT | Performed by: INTERNAL MEDICINE

## 2025-03-10 PROCEDURE — 3079F DIAST BP 80-89 MM HG: CPT | Performed by: INTERNAL MEDICINE

## 2025-03-10 SDOH — ECONOMIC STABILITY: FOOD INSECURITY: WITHIN THE PAST 12 MONTHS, THE FOOD YOU BOUGHT JUST DIDN'T LAST AND YOU DIDN'T HAVE MONEY TO GET MORE.: NEVER TRUE

## 2025-03-10 SDOH — ECONOMIC STABILITY: FOOD INSECURITY: WITHIN THE PAST 12 MONTHS, YOU WORRIED THAT YOUR FOOD WOULD RUN OUT BEFORE YOU GOT MONEY TO BUY MORE.: NEVER TRUE

## 2025-03-10 ASSESSMENT — ENCOUNTER SYMPTOMS
ABDOMINAL PAIN: 0
CONSTIPATION: 0
DIARRHEA: 0
COUGH: 0
VOMITING: 0
NAUSEA: 0
BLOOD IN STOOL: 0
SHORTNESS OF BREATH: 0
EYE PAIN: 0
BACK PAIN: 0

## 2025-03-10 ASSESSMENT — PATIENT HEALTH QUESTIONNAIRE - PHQ9
SUM OF ALL RESPONSES TO PHQ QUESTIONS 1-9: 0
2. FEELING DOWN, DEPRESSED OR HOPELESS: NOT AT ALL
SUM OF ALL RESPONSES TO PHQ QUESTIONS 1-9: 0
1. LITTLE INTEREST OR PLEASURE IN DOING THINGS: NOT AT ALL
SUM OF ALL RESPONSES TO PHQ QUESTIONS 1-9: 0
SUM OF ALL RESPONSES TO PHQ QUESTIONS 1-9: 0

## 2025-03-10 NOTE — PROGRESS NOTES
[facility]  [facility] DEPARTMENT OF [facility]  [address] [facility] [address]  Dept: [phone]  Dept Fax: [phone]  Loc: [phone]     José Miguel Villa is a 49 y.o. male who presents today for his medical conditions/complaintsas noted below.  José Miguel Villa is c/o of   Chief Complaint   Patient presents with    Hypertension    Hyperlipidemia    Anxiety     Chronic          HPI:     Hypertension  This is a chronic problem. The current episode started more than 1 year ago. The problem has been waxing and waning since onset. The problem is controlled. Associated symptoms include anxiety. Pertinent negatives include no chest pain, headaches, neck pain, palpitations or shortness of breath.   Hyperlipidemia  This is a chronic problem. The current episode started more than 1 year ago. The problem is controlled. Recent lipid tests were reviewed and are variable. Pertinent negatives include no chest pain or shortness of breath.   Anxiety  Presents for follow-up visit. Symptoms include decreased concentration and nervous/anxious behavior. Patient reports no chest pain, confusion, dizziness, nausea, palpitations or shortness of breath. Symptoms occur occasionally.           Hemoglobin A1C (%)   Date Value   12/06/2024 6.3 (H)   07/10/2023 5.8   02/08/2023 6.1 (H)            No components found for: \"LABMICR\"  No components found for: \"LDLCHOLESTEROL\", \"LDLCALC\"      AST (U/L)   Date Value   12/06/2024 24     ALT (U/L)   Date Value   12/06/2024 27     BUN (mg/dL)   Date Value   12/06/2024 13     BP Readings from Last 3 Encounters:   03/10/25 132/88   12/05/24 136/86   10/31/24 124/88              Past Medical History:   Diagnosis Date    Abnormal MRI of the head 02/2015    rosemary Diaz incidental finding refered to Tresa ramírez 1 yr f/u MR    ED (erectile dysfunction)     Esophageal ulcer     EGD, Dr. Hatfield, with esophageal ulcer and some

## 2025-03-17 DIAGNOSIS — N52.9 ERECTILE DYSFUNCTION, UNSPECIFIED ERECTILE DYSFUNCTION TYPE: ICD-10-CM

## 2025-03-17 RX ORDER — TADALAFIL 20 MG/1
TABLET ORAL
Qty: 36 TABLET | Refills: 3 | Status: SHIPPED | OUTPATIENT
Start: 2025-03-17

## 2025-03-17 NOTE — TELEPHONE ENCOUNTER
José Miguel called requesting a refill of the below medication which has been pended for you:     Requested Prescriptions     Pending Prescriptions Disp Refills    tadalafil (CIALIS) 20 MG tablet [Pharmacy Med Name: TADALAFIL TAB 20MG(C)] 36 tablet 3     Sig: TAKE 1 TABLET AS NEEDED FORERECTILE DYSFUNCTION       Last Appointment Date: 3/10/2025  Next Appointment Date: 7/17/2025    No Known Allergies

## 2025-03-19 ENCOUNTER — HOSPITAL ENCOUNTER (OUTPATIENT)
Dept: CT IMAGING | Age: 50
Discharge: HOME | End: 2025-03-21
Attending: INTERNAL MEDICINE

## 2025-03-19 ENCOUNTER — HOSPITAL ENCOUNTER (OUTPATIENT)
Dept: PULMONOLOGY | Age: 50
Discharge: HOME | End: 2025-03-19
Attending: INTERNAL MEDICINE

## 2025-03-19 DIAGNOSIS — R91.1 LUNG NODULE: ICD-10-CM

## 2025-03-19 DIAGNOSIS — R05.3 CHRONIC COUGH: ICD-10-CM

## 2025-03-19 LAB
DLCO %PRED: NORMAL
DLCO PRED: NORMAL
DLCO/VA %PRED: NORMAL
DLCO/VA PRED: NORMAL
DLCO/VA: NORMAL
DLCO: NORMAL
EXPIRATORY TIME-POST: NORMAL
EXPIRATORY TIME: NORMAL
FEF 25-75 %CHNG: NORMAL
FEF 25-75 POST %PRED: NORMAL
FEF 25-75% %PRED-PRE: NORMAL
FEF 25-75% PRED: NORMAL
FEF 25-75-POST: NORMAL
FEF 25-75-PRE: NORMAL
FEV1 %PRED-POST: NORMAL
FEV1 %PRED-PRE: NORMAL
FEV1 PRED: NORMAL
FEV1-POST: NORMAL
FEV1-PRE: NORMAL
FEV1/FVC %PRED-POST: NORMAL
FEV1/FVC %PRED-PRE: NORMAL
FEV1/FVC PRED: NORMAL
FEV1/FVC-POST: NORMAL
FEV1/FVC-PRE: NORMAL
FVC %PRED-POST: NORMAL
FVC %PRED-PRE: NORMAL
FVC PRED: NORMAL
FVC-POST: NORMAL
FVC-PRE: NORMAL
GAW %PRED: NORMAL
GAW PRED: NORMAL
GAW: NORMAL
IC PRE %PRED: NORMAL
IC PRED: NORMAL
IC: NORMAL
MEP: NORMAL
MIP: NORMAL
MVV %PRED-PRE: NORMAL
MVV PRED: NORMAL
MVV-PRE: NORMAL
PEF %PRED-POST: NORMAL
PEF %PRED-PRE: NORMAL
PEF PRED: NORMAL
PEF%CHNG: NORMAL
PEF-POST: NORMAL
PEF-PRE: NORMAL
RAW %PRED: NORMAL
RAW PRED: NORMAL
RAW: NORMAL
RV PRE %PRED: NORMAL
RV PRED: NORMAL
RV: NORMAL
SVC %PRED: NORMAL
SVC PRED: NORMAL
SVC: NORMAL
TLC PRE %PRED: NORMAL
TLC PRED: NORMAL
TLC: NORMAL
VA %PRED: NORMAL
VA PRED: NORMAL
VA: NORMAL
VTG %PRED: NORMAL
VTG PRED: NORMAL
VTG: NORMAL

## 2025-03-19 PROCEDURE — 71250 CT THORAX DX C-: CPT

## 2025-03-19 PROCEDURE — 6370000000 HC RX 637 (ALT 250 FOR IP): Performed by: INTERNAL MEDICINE

## 2025-03-19 RX ORDER — ALBUTEROL SULFATE 90 UG/1
4 INHALANT RESPIRATORY (INHALATION) ONCE
Status: COMPLETED | OUTPATIENT
Start: 2025-03-19 | End: 2025-03-19

## 2025-03-19 RX ADMIN — ALBUTEROL SULFATE 4 PUFF: 90 AEROSOL, METERED RESPIRATORY (INHALATION) at 09:20

## 2025-03-20 ENCOUNTER — RESULTS FOLLOW-UP (OUTPATIENT)
Dept: CT IMAGING | Age: 50
End: 2025-03-20

## 2025-04-17 ENCOUNTER — OFFICE VISIT (OUTPATIENT)
Dept: CARDIOLOGY | Age: 50
End: 2025-04-17
Payer: COMMERCIAL

## 2025-04-17 ENCOUNTER — TELEPHONE (OUTPATIENT)
Dept: PAIN MANAGEMENT | Age: 50
End: 2025-04-17

## 2025-04-17 ENCOUNTER — RESULTS FOLLOW-UP (OUTPATIENT)
Dept: INTERNAL MEDICINE | Age: 50
End: 2025-04-17

## 2025-04-17 VITALS
WEIGHT: 312 LBS | BODY MASS INDEX: 42.26 KG/M2 | OXYGEN SATURATION: 97 % | HEART RATE: 90 BPM | SYSTOLIC BLOOD PRESSURE: 150 MMHG | HEIGHT: 72 IN | DIASTOLIC BLOOD PRESSURE: 100 MMHG

## 2025-04-17 DIAGNOSIS — Z72.0 TOBACCO ABUSE: ICD-10-CM

## 2025-04-17 DIAGNOSIS — Z09 CARDIOLOGY FOLLOW-UP ENCOUNTER: Primary | ICD-10-CM

## 2025-04-17 DIAGNOSIS — I10 ESSENTIAL HYPERTENSION: ICD-10-CM

## 2025-04-17 DIAGNOSIS — E66.01 OBESITY, MORBID, BMI 40.0-49.9: ICD-10-CM

## 2025-04-17 DIAGNOSIS — E11.9 TYPE 2 DIABETES MELLITUS WITHOUT COMPLICATION, WITHOUT LONG-TERM CURRENT USE OF INSULIN: ICD-10-CM

## 2025-04-17 DIAGNOSIS — E78.49 OTHER HYPERLIPIDEMIA: ICD-10-CM

## 2025-04-17 PROCEDURE — 93000 ELECTROCARDIOGRAM COMPLETE: CPT | Performed by: STUDENT IN AN ORGANIZED HEALTH CARE EDUCATION/TRAINING PROGRAM

## 2025-04-17 PROCEDURE — 3080F DIAST BP >= 90 MM HG: CPT | Performed by: STUDENT IN AN ORGANIZED HEALTH CARE EDUCATION/TRAINING PROGRAM

## 2025-04-17 PROCEDURE — 99214 OFFICE O/P EST MOD 30 MIN: CPT | Performed by: STUDENT IN AN ORGANIZED HEALTH CARE EDUCATION/TRAINING PROGRAM

## 2025-04-17 PROCEDURE — 3077F SYST BP >= 140 MM HG: CPT | Performed by: STUDENT IN AN ORGANIZED HEALTH CARE EDUCATION/TRAINING PROGRAM

## 2025-04-17 PROCEDURE — 99406 BEHAV CHNG SMOKING 3-10 MIN: CPT | Performed by: STUDENT IN AN ORGANIZED HEALTH CARE EDUCATION/TRAINING PROGRAM

## 2025-04-17 RX ORDER — MELOXICAM 7.5 MG/1
7.5 TABLET ORAL DAILY
COMMUNITY
Start: 2025-04-02

## 2025-04-17 NOTE — TELEPHONE ENCOUNTER
Insurance will not cover ozempic.  Please advise.      Patient contact # [phone].    Thank you.

## 2025-04-17 NOTE — PROGRESS NOTES
José Miguel Villa  1975  [id number]    Today: 4/17/25    CC:   Chief Complaint   Patient presents with    Hypertension    Tachycardia        HPI:   José Miguel Villa  is stable from cardiac standpoint.   Denies any episodes of chest pain or angina since last evaluation.  No exertional dyspnea, no PND, no syncope or pre-syncope, no orthopnea.     Past Medical History:  Past Medical History:   Diagnosis Date    Abnormal MRI of the head 02/2015    rosemary Diaz incidental finding refered to Tresa rec 1 yr f/u MR    ED (erectile dysfunction)     Esophageal ulcer     EGD, Dr. Hatfield, with esophageal ulcer and some inflammation on biopsy, 05/11.     Fatty liver     incidental finding on CT 2/18    GERD (gastroesophageal reflux disease)     History of stress test     11/16  10.1 mets negative images    Hyperlipidemia     Hypertension     Previously treated with Norvasc.    Hypogonadism in male 11/14/2016     felt Secondary - see endo consult notes in media    Hypothyroidism     previous h/o incresed TSH, then nl off meds    IFG (impaired fasting glucose)     Lung granuloma (HCC)     1 cm calcified granuloma R perihilar,  CT lung OK 2/18    Migraine headache     remote history of    Obesity     Post laminectomy syndrome     Following surgery [facility]  09/12. Epidurals were also done March 2012 and July 2012 by Dr. Olson.  MRI 11/16    Tobacco abuse          Past Surgical History:  Past Surgical History:   Procedure Laterality Date    COLONOSCOPY  05/19/11    LUMBAR SPINE SURGERY  09/12    Lumbosacral spine procedure, Dr. Blackman.     OTHER SURGICAL HISTORY  1980    Procedure for hypospadias.    OTHER SURGICAL HISTORY  03/22/04    Ureterocutaneous fistula repair.    OTHER SURGICAL HISTORY  08/26/05    Bimalleolar fracture repair with open reduction and internal fixation.    OTHER SURGICAL HISTORY  07/17/12, 3/20/12    Interlaminar epidural steroid injection.    OTHER SURGICAL HISTORY Bilateral

## 2025-07-14 ENCOUNTER — HOSPITAL ENCOUNTER (OUTPATIENT)
Age: 50
Discharge: HOME | End: 2025-07-14
Payer: COMMERCIAL

## 2025-07-14 DIAGNOSIS — I10 PRIMARY HYPERTENSION: ICD-10-CM

## 2025-07-14 DIAGNOSIS — R73.01 IFG (IMPAIRED FASTING GLUCOSE): ICD-10-CM

## 2025-07-14 DIAGNOSIS — E78.49 OTHER HYPERLIPIDEMIA: ICD-10-CM

## 2025-07-14 LAB
CHOLEST SERPL-MCNC: 196 MG/DL (ref 0–199)
CHOLESTEROL/HDL RATIO: 5.2
EST. AVERAGE GLUCOSE BLD GHB EST-MCNC: 140 MG/DL
HBA1C MFR BLD: 6.5 % (ref 4–6)
HDLC SERPL-MCNC: 38 MG/DL
LDLC SERPL CALC-MCNC: 117 MG/DL (ref 0–100)
TRIGL SERPL-MCNC: 206 MG/DL (ref 0–149)
VLDLC SERPL CALC-MCNC: 41 MG/DL (ref 1–30)

## 2025-07-14 PROCEDURE — 83036 HEMOGLOBIN GLYCOSYLATED A1C: CPT

## 2025-07-14 PROCEDURE — 80061 LIPID PANEL: CPT

## 2025-07-14 PROCEDURE — 36415 COLL VENOUS BLD VENIPUNCTURE: CPT

## 2025-07-17 ENCOUNTER — OFFICE VISIT (OUTPATIENT)
Dept: INTERNAL MEDICINE | Age: 50
End: 2025-07-17
Payer: COMMERCIAL

## 2025-07-17 VITALS
BODY MASS INDEX: 42.66 KG/M2 | HEIGHT: 72 IN | OXYGEN SATURATION: 97 % | HEART RATE: 106 BPM | RESPIRATION RATE: 16 BRPM | WEIGHT: 315 LBS | SYSTOLIC BLOOD PRESSURE: 116 MMHG | DIASTOLIC BLOOD PRESSURE: 78 MMHG

## 2025-07-17 DIAGNOSIS — D64.9 ANEMIA, UNSPECIFIED TYPE: ICD-10-CM

## 2025-07-17 DIAGNOSIS — I10 PRIMARY HYPERTENSION: Primary | ICD-10-CM

## 2025-07-17 DIAGNOSIS — K21.9 GASTROESOPHAGEAL REFLUX DISEASE WITHOUT ESOPHAGITIS: ICD-10-CM

## 2025-07-17 DIAGNOSIS — Z78.9 WEIGHT LOSS ADVISED: ICD-10-CM

## 2025-07-17 DIAGNOSIS — R73.01 IFG (IMPAIRED FASTING GLUCOSE): ICD-10-CM

## 2025-07-17 DIAGNOSIS — E78.49 OTHER HYPERLIPIDEMIA: ICD-10-CM

## 2025-07-17 DIAGNOSIS — E66.01 OBESITY, MORBID, BMI 40.0-49.9 (HCC): ICD-10-CM

## 2025-07-17 DIAGNOSIS — E03.9 HYPOTHYROIDISM (ACQUIRED): ICD-10-CM

## 2025-07-17 PROCEDURE — 99214 OFFICE O/P EST MOD 30 MIN: CPT | Performed by: INTERNAL MEDICINE

## 2025-07-17 PROCEDURE — 3074F SYST BP LT 130 MM HG: CPT | Performed by: INTERNAL MEDICINE

## 2025-07-17 PROCEDURE — 3078F DIAST BP <80 MM HG: CPT | Performed by: INTERNAL MEDICINE

## 2025-07-17 ASSESSMENT — ENCOUNTER SYMPTOMS
HEARTBURN: 1
SHORTNESS OF BREATH: 0
DIARRHEA: 0
COUGH: 0
VOMITING: 0
BLOOD IN STOOL: 0
CONSTIPATION: 0
ABDOMINAL PAIN: 0
BACK PAIN: 0
EYE PAIN: 0
NAUSEA: 0

## 2025-07-17 NOTE — PROGRESS NOTES
[facility]  [facility] DEPARTMENT OF [facility]  [address]  [facility] [address]  Dept: [phone]  Dept Fax: [phone]  Loc: [phone]     José Miguel Villa is a 49 y.o. male who presents today for his medical conditions/complaintsas noted below.  José Miguel Villa is c/o of   Chief Complaint   Patient presents with    Hypertension    Hyperlipidemia    Gastroesophageal Reflux         HPI:     Hypertension  This is a chronic problem. The current episode started more than 1 year ago. The problem has been waxing and waning since onset. The problem is controlled. Pertinent negatives include no chest pain, headaches, neck pain, palpitations or shortness of breath.   Hyperlipidemia  This is a chronic problem. The current episode started more than 1 year ago. The problem is controlled. Recent lipid tests were reviewed and are variable. Pertinent negatives include no chest pain or shortness of breath.   Gastroesophageal Reflux  He complains of heartburn. He reports no abdominal pain, no chest pain, no coughing or no nausea. This is a recurrent problem. The current episode started more than 1 year ago. The problem occurs rarely. The problem has been waxing and waning.       Hemoglobin A1C (%)   Date Value   07/14/2025 6.5 (H)   12/06/2024 6.3 (H)   07/10/2023 5.8            No components found for: \"LABMICR\"  No components found for: \"LDLCHOLESTEROL\", \"LDLCALC\"      AST (U/L)   Date Value   12/06/2024 24     ALT (U/L)   Date Value   12/06/2024 27     BUN (mg/dL)   Date Value   12/06/2024 13     BP Readings from Last 3 Encounters:   07/17/25 116/78   04/17/25 (!) 150/100   03/10/25 132/88              Past Medical History:   Diagnosis Date    Abnormal MRI of the head 02/2015    rosemary Diaz incidental finding refered to Tresa ramírez 1 yr f/u MR    ED (erectile dysfunction)     Esophageal ulcer     EGD, Dr. Hatfield, with esophageal ulcer and some

## 2025-09-04 ENCOUNTER — OFFICE VISIT (OUTPATIENT)
Dept: CARDIOLOGY | Age: 50
End: 2025-09-04

## 2025-09-04 VITALS
SYSTOLIC BLOOD PRESSURE: 102 MMHG | BODY MASS INDEX: 41.34 KG/M2 | WEIGHT: 305.2 LBS | OXYGEN SATURATION: 96 % | HEIGHT: 72 IN | HEART RATE: 93 BPM | DIASTOLIC BLOOD PRESSURE: 68 MMHG

## 2025-09-04 DIAGNOSIS — Z09 CARDIOLOGY FOLLOW-UP ENCOUNTER: Primary | ICD-10-CM

## 2025-09-04 DIAGNOSIS — Z72.0 TOBACCO ABUSE: ICD-10-CM

## 2025-09-04 DIAGNOSIS — I10 ESSENTIAL HYPERTENSION: ICD-10-CM

## 2025-09-04 DIAGNOSIS — E66.01 OBESITY, MORBID, BMI 40.0-49.9 (HCC): ICD-10-CM

## 2025-09-04 DIAGNOSIS — E78.49 OTHER HYPERLIPIDEMIA: ICD-10-CM

## 2025-09-04 RX ORDER — PREGABALIN 100 MG/1
CAPSULE ORAL
COMMUNITY
Start: 2025-09-03